# Patient Record
Sex: MALE | Race: WHITE | Employment: UNEMPLOYED | URBAN - METROPOLITAN AREA
[De-identification: names, ages, dates, MRNs, and addresses within clinical notes are randomized per-mention and may not be internally consistent; named-entity substitution may affect disease eponyms.]

---

## 2017-04-14 ENCOUNTER — HOSPITAL ENCOUNTER (INPATIENT)
Age: 27
LOS: 2 days | Discharge: HOME | DRG: 638 | End: 2017-04-16
Attending: FAMILY MEDICINE | Admitting: HOSPITALIST
Payer: SELF-PAY

## 2017-04-14 DIAGNOSIS — E86.0 DEHYDRATION: ICD-10-CM

## 2017-04-14 DIAGNOSIS — E10.10 TYPE 1 DIABETES MELLITUS WITH KETOACIDOSIS WITHOUT COMA (HCC): Primary | ICD-10-CM

## 2017-04-14 PROBLEM — R11.10 VOMITING: Status: ACTIVE | Noted: 2017-04-14

## 2017-04-14 LAB
ADMINISTERED INITIALS, ADMINIT: NORMAL
ALBUMIN SERPL BCP-MCNC: 3.9 G/DL (ref 3.4–5)
ALBUMIN SERPL BCP-MCNC: 4.9 G/DL (ref 3.4–5)
ALBUMIN/GLOB SERPL: 0.9 {RATIO} (ref 0.8–1.7)
ALBUMIN/GLOB SERPL: 1 {RATIO} (ref 0.8–1.7)
ALP SERPL-CCNC: 104 U/L (ref 45–117)
ALP SERPL-CCNC: 142 U/L (ref 45–117)
ALT SERPL-CCNC: 23 U/L (ref 16–61)
ALT SERPL-CCNC: 29 U/L (ref 16–61)
ANION GAP BLD CALC-SCNC: 12 MMOL/L (ref 3–18)
ANION GAP BLD CALC-SCNC: 20 MMOL/L (ref 3–18)
ANION GAP BLD CALC-SCNC: 30 MMOL/L (ref 10–20)
ANION GAP BLD CALC-SCNC: 30 MMOL/L (ref 3–18)
APPEARANCE UR: CLEAR
ARTERIAL PATENCY WRIST A: ABNORMAL
AST SERPL W P-5'-P-CCNC: 13 U/L (ref 15–37)
AST SERPL W P-5'-P-CCNC: 9 U/L (ref 15–37)
BACTERIA URNS QL MICRO: ABNORMAL /HPF
BASE DEFICIT BLDV-SCNC: 22 MMOL/L
BASOPHILS # BLD AUTO: 0 K/UL (ref 0–0.06)
BASOPHILS # BLD: 0 % (ref 0–2)
BDY SITE: ABNORMAL
BILIRUB SERPL-MCNC: 0.7 MG/DL (ref 0.2–1)
BILIRUB SERPL-MCNC: 0.7 MG/DL (ref 0.2–1)
BILIRUB UR QL: NEGATIVE
BODY TEMPERATURE: 97
BUN BLD-MCNC: 15 MG/DL (ref 7–18)
BUN SERPL-MCNC: 10 MG/DL (ref 7–18)
BUN SERPL-MCNC: 10 MG/DL (ref 7–18)
BUN SERPL-MCNC: 13 MG/DL (ref 7–18)
BUN/CREAT SERPL: 8 (ref 12–20)
CA-I BLD-MCNC: 1.3 MMOL/L (ref 1.12–1.32)
CALCIUM SERPL-MCNC: 8.5 MG/DL (ref 8.5–10.1)
CALCIUM SERPL-MCNC: 8.6 MG/DL (ref 8.5–10.1)
CALCIUM SERPL-MCNC: 9.5 MG/DL (ref 8.5–10.1)
CHLORIDE BLD-SCNC: 104 MMOL/L (ref 100–108)
CHLORIDE SERPL-SCNC: 105 MMOL/L (ref 100–108)
CHLORIDE SERPL-SCNC: 106 MMOL/L (ref 100–108)
CHLORIDE SERPL-SCNC: 94 MMOL/L (ref 100–108)
CK MB CFR SERPL CALC: 5.2 % (ref 0–4)
CK MB SERPL-MCNC: 4.8 NG/ML (ref 5–25)
CK SERPL-CCNC: 93 U/L (ref 39–308)
CO2 BLD-SCNC: 9 MMOL/L (ref 19–24)
CO2 SERPL-SCNC: 11 MMOL/L (ref 21–32)
CO2 SERPL-SCNC: 15 MMOL/L (ref 21–32)
CO2 SERPL-SCNC: 19 MMOL/L (ref 21–32)
COLOR UR: YELLOW
CREAT SERPL-MCNC: 1.28 MG/DL (ref 0.6–1.3)
CREAT SERPL-MCNC: 1.31 MG/DL (ref 0.6–1.3)
CREAT SERPL-MCNC: 1.66 MG/DL (ref 0.6–1.3)
CREAT UR-MCNC: 1.1 MG/DL (ref 0.6–1.3)
D50 ADMINISTERED, D50ADM: 0 ML
D50 ORDER, D50ORD: 0 ML
DIFFERENTIAL METHOD BLD: ABNORMAL
EOSINOPHIL # BLD: 0.1 K/UL (ref 0–0.4)
EOSINOPHIL NFR BLD: 0 % (ref 0–5)
EPITH CASTS URNS QL MICRO: 0 /LPF (ref 0–5)
ERYTHROCYTE [DISTWIDTH] IN BLOOD BY AUTOMATED COUNT: 12.5 % (ref 11.6–14.5)
EST. AVERAGE GLUCOSE BLD GHB EST-MCNC: 220 MG/DL
GAS FLOW.O2 O2 DELIVERY SYS: ABNORMAL L/MIN
GLOBULIN SER CALC-MCNC: 4.1 G/DL (ref 2–4)
GLOBULIN SER CALC-MCNC: 5.2 G/DL (ref 2–4)
GLUCOSE BLD STRIP.AUTO-MCNC: 111 MG/DL (ref 70–110)
GLUCOSE BLD STRIP.AUTO-MCNC: 160 MG/DL (ref 70–110)
GLUCOSE BLD STRIP.AUTO-MCNC: 165 MG/DL (ref 70–110)
GLUCOSE BLD STRIP.AUTO-MCNC: 175 MG/DL (ref 70–110)
GLUCOSE BLD STRIP.AUTO-MCNC: 176 MG/DL (ref 70–110)
GLUCOSE BLD STRIP.AUTO-MCNC: 184 MG/DL (ref 70–110)
GLUCOSE BLD STRIP.AUTO-MCNC: 211 MG/DL (ref 70–110)
GLUCOSE BLD STRIP.AUTO-MCNC: 234 MG/DL (ref 70–110)
GLUCOSE BLD STRIP.AUTO-MCNC: 240 MG/DL (ref 70–110)
GLUCOSE BLD STRIP.AUTO-MCNC: 270 MG/DL (ref 70–110)
GLUCOSE BLD STRIP.AUTO-MCNC: 287 MG/DL (ref 70–110)
GLUCOSE BLD STRIP.AUTO-MCNC: 304 MG/DL (ref 70–110)
GLUCOSE BLD STRIP.AUTO-MCNC: 399 MG/DL (ref 70–110)
GLUCOSE BLD STRIP.AUTO-MCNC: 418 MG/DL (ref 74–106)
GLUCOSE BLD STRIP.AUTO-MCNC: 88 MG/DL (ref 70–110)
GLUCOSE BLD STRIP.AUTO-MCNC: 99 MG/DL (ref 70–110)
GLUCOSE SERPL-MCNC: 193 MG/DL (ref 74–99)
GLUCOSE SERPL-MCNC: 281 MG/DL (ref 74–99)
GLUCOSE SERPL-MCNC: 433 MG/DL (ref 74–99)
GLUCOSE UR STRIP.AUTO-MCNC: >1000 MG/DL
GLUCOSE, GLC: 111 MG/DL
GLUCOSE, GLC: 160 MG/DL
GLUCOSE, GLC: 165 MG/DL
GLUCOSE, GLC: 175 MG/DL
GLUCOSE, GLC: 176 MG/DL
GLUCOSE, GLC: 184 MG/DL
GLUCOSE, GLC: 211 MG/DL
GLUCOSE, GLC: 234 MG/DL
GLUCOSE, GLC: 240 MG/DL
GLUCOSE, GLC: 270 MG/DL
GLUCOSE, GLC: 287 MG/DL
GLUCOSE, GLC: 304 MG/DL
GLUCOSE, GLC: 88 MG/DL
GLUCOSE, GLC: 99 MG/DL
HBA1C MFR BLD: 9.3 % (ref 4.5–5.6)
HCO3 BLDV-SCNC: 7.3 MMOL/L (ref 23–28)
HCT VFR BLD AUTO: 53.1 % (ref 36–48)
HCT VFR BLD CALC: 56 % (ref 36–49)
HGB BLD-MCNC: 18.6 G/DL (ref 13–16)
HGB BLD-MCNC: 19 G/DL (ref 12–16)
HGB UR QL STRIP: ABNORMAL
HIGH TARGET, HITG: 180 MG/DL
INSULIN ADMINSTERED, INSADM: 1.3 UNITS/HOUR
INSULIN ADMINSTERED, INSADM: 1.8 UNITS/HOUR
INSULIN ADMINSTERED, INSADM: 11.4 UNITS/HOUR
INSULIN ADMINSTERED, INSADM: 12.2 UNITS/HOUR
INSULIN ADMINSTERED, INSADM: 14.6 UNITS/HOUR
INSULIN ADMINSTERED, INSADM: 16.8 UNITS/HOUR
INSULIN ADMINSTERED, INSADM: 2.2 UNITS/HOUR
INSULIN ADMINSTERED, INSADM: 3.6 UNITS/HOUR
INSULIN ADMINSTERED, INSADM: 3.7 UNITS/HOUR
INSULIN ADMINSTERED, INSADM: 4.2 UNITS/HOUR
INSULIN ADMINSTERED, INSADM: 4.6 UNITS/HOUR
INSULIN ADMINSTERED, INSADM: 6 UNITS/HOUR
INSULIN ADMINSTERED, INSADM: 7 UNITS/HOUR
INSULIN ADMINSTERED, INSADM: 8.1 UNITS/HOUR
INSULIN ORDER, INSORD: 1.3 UNITS/HOUR
INSULIN ORDER, INSORD: 1.8 UNITS/HOUR
INSULIN ORDER, INSORD: 11.4 UNITS/HOUR
INSULIN ORDER, INSORD: 12.2 UNITS/HOUR
INSULIN ORDER, INSORD: 14.6 UNITS/HOUR
INSULIN ORDER, INSORD: 16.8 UNITS/HOUR
INSULIN ORDER, INSORD: 2.2 UNITS/HOUR
INSULIN ORDER, INSORD: 3.6 UNITS/HOUR
INSULIN ORDER, INSORD: 3.7 UNITS/HOUR
INSULIN ORDER, INSORD: 4.2 UNITS/HOUR
INSULIN ORDER, INSORD: 4.6 UNITS/HOUR
INSULIN ORDER, INSORD: 6 UNITS/HOUR
INSULIN ORDER, INSORD: 7 UNITS/HOUR
INSULIN ORDER, INSORD: 8.1 UNITS/HOUR
KETONES UR QL STRIP.AUTO: >160 MG/DL
LEUKOCYTE ESTERASE UR QL STRIP.AUTO: NEGATIVE
LOW TARGET, LOT: 140 MG/DL
LYMPHOCYTES # BLD AUTO: 10 % (ref 21–52)
LYMPHOCYTES # BLD: 1.8 K/UL (ref 0.9–3.6)
MCH RBC QN AUTO: 31 PG (ref 24–34)
MCHC RBC AUTO-ENTMCNC: 35 G/DL (ref 31–37)
MCV RBC AUTO: 88.5 FL (ref 74–97)
MINUTES UNTIL NEXT BG, NBG: 60 MIN
MONOCYTES # BLD: 0.7 K/UL (ref 0.05–1.2)
MONOCYTES NFR BLD AUTO: 4 % (ref 3–10)
MULTIPLIER, MUL: 0.02
MULTIPLIER, MUL: 0.03
MULTIPLIER, MUL: 0.04
MULTIPLIER, MUL: 0.05
MULTIPLIER, MUL: 0.06
MULTIPLIER, MUL: 0.06
MULTIPLIER, MUL: 0.07
MULTIPLIER, MUL: 0.08
NEUTS SEG # BLD: 16 K/UL (ref 1.8–8)
NEUTS SEG NFR BLD AUTO: 86 % (ref 40–73)
NITRITE UR QL STRIP.AUTO: NEGATIVE
ORDER INITIALS, ORDINIT: NORMAL
PCO2 BLDV: 25.8 MMHG (ref 41–51)
PH BLDV: 7.06 [PH] (ref 7.32–7.42)
PH UR STRIP: 5 [PH] (ref 5–8)
PLATELET # BLD AUTO: 317 K/UL (ref 135–420)
PMV BLD AUTO: 10.5 FL (ref 9.2–11.8)
PO2 BLDV: 28 MMHG (ref 25–40)
POTASSIUM BLD-SCNC: 4.9 MMOL/L (ref 3.5–5.5)
POTASSIUM SERPL-SCNC: 3.6 MMOL/L (ref 3.5–5.5)
POTASSIUM SERPL-SCNC: 4.3 MMOL/L (ref 3.5–5.5)
POTASSIUM SERPL-SCNC: 4.9 MMOL/L (ref 3.5–5.5)
PROT SERPL-MCNC: 10.1 G/DL (ref 6.4–8.2)
PROT SERPL-MCNC: 8 G/DL (ref 6.4–8.2)
PROT UR STRIP-MCNC: 100 MG/DL
RBC # BLD AUTO: 6 M/UL (ref 4.7–5.5)
RBC #/AREA URNS HPF: ABNORMAL /HPF (ref 0–5)
SAO2 % BLDV: 35 % (ref 65–88)
SERVICE CMNT-IMP: ABNORMAL
SODIUM BLD-SCNC: 137 MMOL/L (ref 136–145)
SODIUM SERPL-SCNC: 135 MMOL/L (ref 136–145)
SODIUM SERPL-SCNC: 137 MMOL/L (ref 136–145)
SODIUM SERPL-SCNC: 140 MMOL/L (ref 136–145)
SP GR UR REFRACTOMETRY: 1.02 (ref 1–1.03)
SPECIMEN TYPE: ABNORMAL
TROPONIN I SERPL-MCNC: <0.02 NG/ML (ref 0–0.06)
UROBILINOGEN UR QL STRIP.AUTO: 0.2 EU/DL (ref 0.2–1)
WBC # BLD AUTO: 18.6 K/UL (ref 4.6–13.2)
WBC URNS QL MICRO: ABNORMAL /HPF (ref 0–5)

## 2017-04-14 PROCEDURE — 99285 EMERGENCY DEPT VISIT HI MDM: CPT

## 2017-04-14 PROCEDURE — 82962 GLUCOSE BLOOD TEST: CPT

## 2017-04-14 PROCEDURE — 82550 ASSAY OF CK (CPK): CPT | Performed by: FAMILY MEDICINE

## 2017-04-14 PROCEDURE — 80053 COMPREHEN METABOLIC PANEL: CPT | Performed by: HOSPITALIST

## 2017-04-14 PROCEDURE — 36415 COLL VENOUS BLD VENIPUNCTURE: CPT | Performed by: HOSPITALIST

## 2017-04-14 PROCEDURE — 74011636637 HC RX REV CODE- 636/637: Performed by: FAMILY MEDICINE

## 2017-04-14 PROCEDURE — 65610000006 HC RM INTENSIVE CARE

## 2017-04-14 PROCEDURE — 81001 URINALYSIS AUTO W/SCOPE: CPT | Performed by: FAMILY MEDICINE

## 2017-04-14 PROCEDURE — 80048 BASIC METABOLIC PNL TOTAL CA: CPT | Performed by: HOSPITALIST

## 2017-04-14 PROCEDURE — 80047 BASIC METABLC PNL IONIZED CA: CPT

## 2017-04-14 PROCEDURE — 96375 TX/PRO/DX INJ NEW DRUG ADDON: CPT

## 2017-04-14 PROCEDURE — 74011250636 HC RX REV CODE- 250/636: Performed by: FAMILY MEDICINE

## 2017-04-14 PROCEDURE — 85025 COMPLETE CBC W/AUTO DIFF WBC: CPT | Performed by: FAMILY MEDICINE

## 2017-04-14 PROCEDURE — 82803 BLOOD GASES ANY COMBINATION: CPT

## 2017-04-14 PROCEDURE — 82010 KETONE BODYS QUAN: CPT | Performed by: FAMILY MEDICINE

## 2017-04-14 PROCEDURE — 74011000258 HC RX REV CODE- 258: Performed by: HOSPITALIST

## 2017-04-14 PROCEDURE — 96365 THER/PROPH/DIAG IV INF INIT: CPT

## 2017-04-14 PROCEDURE — 74011000258 HC RX REV CODE- 258: Performed by: FAMILY MEDICINE

## 2017-04-14 PROCEDURE — 80053 COMPREHEN METABOLIC PANEL: CPT | Performed by: FAMILY MEDICINE

## 2017-04-14 PROCEDURE — 83036 HEMOGLOBIN GLYCOSYLATED A1C: CPT | Performed by: HOSPITALIST

## 2017-04-14 PROCEDURE — 96361 HYDRATE IV INFUSION ADD-ON: CPT

## 2017-04-14 RX ORDER — SODIUM CHLORIDE 9 MG/ML
150 INJECTION, SOLUTION INTRAVENOUS CONTINUOUS
Status: DISCONTINUED | OUTPATIENT
Start: 2017-04-14 | End: 2017-04-14

## 2017-04-14 RX ORDER — MAGNESIUM SULFATE 100 %
4 CRYSTALS MISCELLANEOUS AS NEEDED
Status: DISCONTINUED | OUTPATIENT
Start: 2017-04-14 | End: 2017-04-16 | Stop reason: HOSPADM

## 2017-04-14 RX ORDER — MORPHINE SULFATE 2 MG/ML
2 INJECTION, SOLUTION INTRAMUSCULAR; INTRAVENOUS ONCE
Status: COMPLETED | OUTPATIENT
Start: 2017-04-14 | End: 2017-04-14

## 2017-04-14 RX ORDER — DEXTROSE MONOHYDRATE AND SODIUM CHLORIDE 5; .45 G/100ML; G/100ML
150 INJECTION, SOLUTION INTRAVENOUS CONTINUOUS
Status: DISCONTINUED | OUTPATIENT
Start: 2017-04-14 | End: 2017-04-15

## 2017-04-14 RX ORDER — DEXTROSE 50 % IN WATER (D50W) INTRAVENOUS SYRINGE
25-50 AS NEEDED
Status: DISCONTINUED | OUTPATIENT
Start: 2017-04-14 | End: 2017-04-16 | Stop reason: HOSPADM

## 2017-04-14 RX ORDER — SODIUM CHLORIDE 9 MG/ML
150 INJECTION, SOLUTION INTRAVENOUS CONTINUOUS
Status: DISCONTINUED | OUTPATIENT
Start: 2017-04-14 | End: 2017-04-15

## 2017-04-14 RX ORDER — ONDANSETRON 2 MG/ML
4 INJECTION INTRAMUSCULAR; INTRAVENOUS ONCE
Status: COMPLETED | OUTPATIENT
Start: 2017-04-14 | End: 2017-04-14

## 2017-04-14 RX ADMIN — Medication 2 MG: at 08:39

## 2017-04-14 RX ADMIN — HUMAN INSULIN 10 UNITS: 100 INJECTION, SOLUTION SUBCUTANEOUS at 08:37

## 2017-04-14 RX ADMIN — SODIUM CHLORIDE 2000 ML: 900 INJECTION, SOLUTION INTRAVENOUS at 08:28

## 2017-04-14 RX ADMIN — SODIUM CHLORIDE 3.6 UNITS/HR: 900 INJECTION, SOLUTION INTRAVENOUS at 10:00

## 2017-04-14 RX ADMIN — SODIUM CHLORIDE 150 ML/HR: 900 INJECTION, SOLUTION INTRAVENOUS at 09:23

## 2017-04-14 RX ADMIN — DEXTROSE MONOHYDRATE AND SODIUM CHLORIDE 150 ML/HR: 5; .45 INJECTION, SOLUTION INTRAVENOUS at 18:41

## 2017-04-14 RX ADMIN — ONDANSETRON 4 MG: 2 INJECTION INTRAMUSCULAR; INTRAVENOUS at 08:38

## 2017-04-14 RX ADMIN — DEXTROSE MONOHYDRATE AND SODIUM CHLORIDE 150 ML/HR: 5; .45 INJECTION, SOLUTION INTRAVENOUS at 11:03

## 2017-04-14 NOTE — ED NOTES
Call made to ICU to give report. Unable to give report at this time. Receiving RN to call this RN back.

## 2017-04-14 NOTE — IP AVS SNAPSHOT
Summary of Care Report The Summary of Care report has been created to help improve care coordination. Users with access to [employer] or [address] (Web-based application) may access additional patient information including the Discharge Summary. If you are not currently a [address] user and need more information, please call the number listed below in the [place] [address] section and ask to be connected with Medical Records. Facility Information Name Address Phone [address] [place] [phone] Patient Information Patient Name Sex  Claudia Teresa ([id number]) Male 1990 Discharge Information Admitting Provider Service Area Unit Olya Messer MD / [phone] 8 [address] Surg/Onco / [phone] Discharge Provider Discharge Date/Time Discharge Disposition Destination (none) 2017 (Pending) AHR (none) Patient Language Language ENGLISH [13] Hospital Problems as of 2017  Reviewed: 4/15/2014  1:38 AM by Teresa Frey MD  
  
  
  
 Class Noted - Resolved Last Modified POA Active Problems * (Principal)DKA, type 1 ([address]  2013 - Present 2017 by Olya Messer MD Unknown Entered by Micah Robison Dehydration  2013 - Present 2017 by Olya Messer MD Yes Entered by Micah Robison Hyponatremia  2013 - Present 2017 by Olya Messer MD Yes Entered by Micah Robison Overview Signed 2013  3:50 PM by Micah Robison  
   pseudohyponatremia Abdominal pain  2013 - Present 2017 by Olya Messer MD Yes Entered by Micah Robison Nausea  2013 - Present 2017 by Olya Messer MD Yes Entered by Micah Robison [facility]acute renal failure) [facility]  1/13/2014 - Present 4/14/2017 by Melisa Hart MD Yes Entered by Melisa Hart MD  
  Vomiting  4/14/2017 - Present 4/14/2017 by Melisa Hart MD Unknown Entered by Melisa Hart MD  
  
Non-Hospital Problems as of 4/16/2017  Reviewed: 4/15/2014  1:38 AM by Terry Nuñez MD  
 None You are allergic to the following Allergen Reactions Pcn (Penicillins) Unknown (comments) Family allergy Current Discharge Medication List  
  
CONTINUE these medications which have CHANGED Dose & Instructions Dispensing Information Comments  
 insulin glargine 100 unit/mL injection Commonly known as:  LANTUS What changed:  how much to take Dose:  50 Units 50 Units by SubCUTAneous route nightly. Quantity:  1 Vial  
Refills:  5  
   
 insulin lispro 100 unit/mL injection Commonly known as:  HUMALOG What changed:  how much to take Dose:  10 Units 10 Units by SubCUTAneous route Before breakfast, lunch, and dinner. Quantity:  1 Vial  
Refills:  5 Follow-up Information Follow up With Details Comments Contact Info Murray Schmidt MD   Patient can only remember the practice name and not the physician Discharge Instructions Lab Results Component Value Date/Time Hemoglobin A1c 9.3 04/14/2017 08:22 AM  
 
This lab test reflects that your blood sugar averaged 220 mg/dl  over the past 3 months. It is important to follow up with your provider on a routine basis to continue to evaluate your blood sugar and discuss any necessary changes in treatment. Patient armband removed and shredded DISCHARGE SUMMARY from Nurse The following personal items are in your possession at time of discharge: 
 
  
Visual Aid: None Clothing: Shirt, Pants, Undergarments, Slippers Other Valuables: Cell Phone, Other (comment) (Cell phone ) PATIENT INSTRUCTIONS: 
 
 
F-face looks uneven A-arms unable to move or move unevenly S-speech slurred or non-existent T-time-call 911 as soon as signs and symptoms begin-DO NOT go Back to bed or wait to see if you get better-TIME IS BRAIN. Warning Signs of HEART ATTACK Call 911 if you have these symptoms: 
? Chest discomfort. Most heart attacks involve discomfort in the center of the chest that lasts more than a few minutes, or that goes away and comes back. It can feel like uncomfortable pressure, squeezing, fullness, or pain. ? Discomfort in other areas of the upper body. Symptoms can include pain or discomfort in one or both arms, the back, neck, jaw, or stomach. ? Shortness of breath with or without chest discomfort. ? Other signs may include breaking out in a cold sweat, nausea, or lightheadedness. Don't wait more than five minutes to call [address]! Fast action can save your life. Calling 911 is almost always the fastest way to get lifesaving treatment. Emergency Medical Services staff can begin treatment when they arrive  up to an hour sooner than if someone gets to the hospital by car. The discharge information has been reviewed with the patient. The patient verbalized understanding. Discharge medications reviewed with the patient and appropriate educational materials and side effects teaching were provided. Chart Review Routing History Recipient Method Report Sent By Jossie Domingo MD  
Phone: [phone] In Basket IP Auto Routed Lauren Thomas MD [[id number]] 8/23/2014 10:46 PM 08/23/2014 Kimberli Garcia MD  
Fax: [phone] Phone: [phone] Fax IP Auto Routed Travis Millard MD [[id number]] 8/23/2014 10:46 PM 08/23/2014

## 2017-04-14 NOTE — ED NOTES
Pt reports to this RN, \"I got startled by my pump when it started beeping and I accidentally kicked the urinal over. \" Approx 400 mL of urine in urinal and urine noted to be all over floor. Site cleaned and housekeeping called. Pt resting in stretcher in NAD. Pt remains on cardiac monitor and IV fluids. Family at bedside. Pt updated on plan of care regarding insulin drip.

## 2017-04-14 NOTE — DIABETES MGMT
Diabetes Patient/Family Education Record    Factors That  May Influence Patients Ability  to Learn or  Comply With  Recommendations:    []   Language barrier    []   Cultural needs   [x]   Motivation    []   Cognitive limitation    []   Physical   []   Education    []   Physiological factors   []   Hearing/vision/speaking impairment   []   Voodoo beliefs    []   Financial factors   []  Other:   []  No factors identified at this time.      Person Instructed:   [x]   Patient   []   Family   []  Other     Preference for Learning:   [x]   Verbal   [x]   Written   []  Demonstration     Level of Comprehension & Competence:    []  Good                                      [x] Fair                                     []  Poor                             [x]  Needs Reinforcement   []  Teachback completed    Education Component:   [x]  Medication management, confirmation of home regimen   [x]  Nutritional management including the role of carbohydrate intake, CHO Counting   []  Exercise   [x]  Signs, symptoms, and treatment of hyperglycemia and hypoglycemia   [x] Treatment of hyperglycemia and hypoglycemia   [x]  Importance of blood glucose monitoring and how to obtain a blood glucose meter    []  Instruction on use of blood glucose meter   [x]  Discuss the importance of HbA1C monitoring and provide patient with  results   []  Sick day guidelines   []  Proper use and disposal of lancets, needles, syringes or insulin pens (if appropriate)   []  Potential long-term complications (retinopathy, kidney disease, neuropathy, heart disease, stroke, vascular disease, foot care)   [] Provide emergency contact number and contact number for more information    [x]  Goal:  Patient/family will demonstrate understanding of Diabetes Self Management Skills by: (date) __5/1/17___  Plan for post-discharge education or self-management support:    [x] Outpatient class schedule provided            [] Patient Declined    [] Scheduled for outpatient classes (date) _______         M.  Callie Bah, MPH, RD

## 2017-04-14 NOTE — ED NOTES
Karine Bishop MD to bedside to speak with and assess pt. Pt resting in stretcher in NAD, VSS at this time. Pt noted to be texting. Pt calm and cooperative. MD to place orders for D5% 0.45% NS due to FSBS of 184 mg/dL per protocol.

## 2017-04-14 NOTE — ED NOTES
Call made to lab regarding metabolic panel results. Critical value received. Laboratory states results to be posted at this time.

## 2017-04-14 NOTE — ROUTINE PROCESS
TRANSFER - OUT REPORT:    Verbal report given to Sadie Washington RN (name) on Huseyin Alba II  being transferred to ICU (unit) for routine progression of care       Report consisted of patients Situation, Background, Assessment and   Recommendations(SBAR). Information from the following report(s) SBAR, ED Summary, MAR, Recent Results and Cardiac Rhythm Sinus Tachycardia was reviewed with the receiving nurse. Lines:   Peripheral IV 04/14/17 Left Antecubital (Active)   Site Assessment Clean, dry, & intact 4/14/2017  8:27 AM   Phlebitis Assessment 0 4/14/2017  8:27 AM   Infiltration Assessment 0 4/14/2017  8:27 AM   Dressing Status Clean, dry, & intact 4/14/2017  8:27 AM   Dressing Type Transparent 4/14/2017  8:27 AM   Hub Color/Line Status Patent; Flushed 4/14/2017  8:27 AM   Action Taken Blood drawn 4/14/2017  8:27 AM       Peripheral IV 04/14/17 Right Antecubital (Active)   Site Assessment Clean, dry, & intact 4/14/2017  8:27 AM   Phlebitis Assessment 0 4/14/2017  8:27 AM   Infiltration Assessment 0 4/14/2017  8:27 AM   Dressing Status Clean, dry, & intact 4/14/2017  8:27 AM   Dressing Type Transparent 4/14/2017  8:27 AM   Hub Color/Line Status Patent; Flushed 4/14/2017  8:27 AM        Opportunity for questions and clarification was provided.       Patient transported with:   Monitor  Registered Nurse  Tech

## 2017-04-14 NOTE — ED NOTES
Pt reports taking 20 units of Humalog at home at approx 0700 this am. Pt states, \"I wanted to see if it would make me feel better. \" MD aware. Pt given urinal and pt verbalizes understanding that urine specimen is needed. Warm blankets provided. Pt calm and cooperative at this time. Family to bedside.

## 2017-04-14 NOTE — PROGRESS NOTES
conducted an initial consultation and Spiritual Assessment for Magui Gamino, who is a 33 y.o.,male. Patients Primary Language is: [place]. According to the patients EMR Voodoo Affiliation is: No Jainism. The reason the Patient came to the hospital is:   Patient Active Problem List    Diagnosis Date Noted    Vomiting 04/14/2017    ARF (acute renal failure) ([address] 01/13/2014    DKA, type 1 ([address] 02/05/2013    Dehydration 02/05/2013    Hyponatremia 02/05/2013    Abdominal pain 02/05/2013    Nausea 02/05/2013        The  provided the following Interventions:  Initiated a relationship of care and support. Explored issues of bushra, belief, spirituality and Lutheran/ritual needs while hospitalized. Listened empathically. Provided chaplaincy education. Provided information about Spiritual Care Services. Offered assurance of prayer on patient's behalf. Chart reviewed. The following outcomes where achieved:   confirmed Patient's Voodoo Affiliation. Patient processed feeling about current hospitalization. Patient expressed gratitude for 's visit. Assessment:  Patient does not have any Lutheran/cultural needs that will affect patients preferences in health care. Plan:  Chaplains will continue to follow and will provide pastoral care on an as needed/requested basis.  recommends bedside caregivers page  on duty if patient shows signs of acute spiritual or emotional distress. Rev.  Hima Song  [address] [facility]  [phone]

## 2017-04-14 NOTE — IP AVS SNAPSHOT
[address] [place] [address] 
[phone] Patient: Rachel Myrick 
MRN: [id number] OOQ:[id number] You are allergic to the following Allergen Reactions Pcn (Penicillins) Unknown (comments) Family allergy Recent Documentation Height Weight BMI Smoking Status 1.803 m 79.7 kg 24.52 kg/m2 Former Smoker Unresulted Labs Order Current Status BETA-HYDROXYBUTYRATE In process Emergency Contacts Name Discharge Info Relation Home Work Mobile Juana Roblero  Parent [1] [phone] About your hospitalization You were admitted on:  April 14, 2017 You last received care in the:  [address] [place] [address] You were discharged on:  April 16, 2017 Unit phone number:  [phone] Why you were hospitalized Your primary diagnosis was:  Dka, Type 1 (Hcc) Your diagnoses also included:  Nausea, Arf (Acute Renal Failure) (Hcc), Dehydration, Hyponatremia, Abdominal Pain, Vomiting Providers Seen During Your Hospitalizations Provider Role Specialty Primary office phone Jerardo Longo MD Attending Provider Emergency Medicine [phone] Eliseo Toure MD Attending Provider [facility] [phone] Your Primary Care Physician (PCP) Primary Care Physician Office Phone Office Fax OTHER, PHYS ** None ** ** None ** Follow-up Information Follow up With Details Comments Contact Info Murray Schmidt MD   Patient can only remember the practice name and not the physician Current Discharge Medication List  
  
CONTINUE these medications which have CHANGED Dose & Instructions Dispensing Information Comments Morning Noon Evening Bedtime  
 insulin glargine 100 unit/mL injection Commonly known as:  LANTUS What changed:  how much to take Your last dose was: Your next dose is: Dose:  50 Units 50 Units by SubCUTAneous route nightly. Quantity:  1 Vial  
Refills:  5  
     
   
   
   
  
 insulin lispro 100 unit/mL injection Commonly known as:  HUMALOG What changed:  how much to take Your last dose was: Your next dose is:    
   
   
 Dose:  10 Units 10 Units by SubCUTAneous route Before breakfast, lunch, and dinner. Quantity:  1 Vial  
Refills:  5 Where to Get Your Medications Information on where to get these meds will be given to you by the nurse or doctor. ! Ask your nurse or doctor about these medications  
  insulin glargine 100 unit/mL injection  
 insulin lispro 100 unit/mL injection Discharge Instructions Lab Results Component Value Date/Time Hemoglobin A1c 9.3 04/14/2017 08:22 AM  
 
This lab test reflects that your blood sugar averaged 220 mg/dl  over the past 3 months. It is important to follow up with your provider on a routine basis to continue to evaluate your blood sugar and discuss any necessary changes in treatment. Patient armband removed and shredded DISCHARGE SUMMARY from Nurse The following personal items are in your possession at time of discharge: 
 
  
Visual Aid: None Clothing: Shirt, Pants, Undergarments, Slippers Other Valuables: Cell Phone, Other (comment) (Cell phone ) PATIENT INSTRUCTIONS: 
 
 
F-face looks uneven A-arms unable to move or move unevenly S-speech slurred or non-existent T-time-call 911 as soon as signs and symptoms begin-DO NOT go Back to bed or wait to see if you get better-TIME IS BRAIN. Warning Signs of HEART ATTACK Call 911 if you have these symptoms: 
? Chest discomfort. Most heart attacks involve discomfort in the center of the chest that lasts more than a few minutes, or that goes away and comes back. It can feel like uncomfortable pressure, squeezing, fullness, or pain. ? Discomfort in other areas of the upper body. Symptoms can include pain or discomfort in one or both arms, the back, neck, jaw, or stomach. ? Shortness of breath with or without chest discomfort. ? Other signs may include breaking out in a cold sweat, nausea, or lightheadedness. Don't wait more than five minutes to call [address]! Fast action can save your life. Calling 911 is almost always the fastest way to get lifesaving treatment. Emergency Medical Services staff can begin treatment when they arrive  up to an hour sooner than if someone gets to the hospital by car. The discharge information has been reviewed with the patient. The patient verbalized understanding. Discharge medications reviewed with the patient and appropriate educational materials and side effects teaching were provided. Discharge Orders None Introducing [facility]! [employer] introduces [employer] patient portal. Now you can access parts of your medical record, email your doctor's office, and request medication refills online. 1. In your internet browser, go to [email or web address] 2. Click on the First Time User? Click Here link in the Sign In box. You will see the New Member Sign Up page. 3. Enter your Elevate Access Code exactly as it appears below. You will not need to use this code after youve completed the sign-up process. If you do not sign up before the expiration date, you must request a new code. · Ele[employer] Access Code: [email or web address] Expires: 7/13/2017  7:37 AM 
 
4.  Enter the last four digits of your Social Security Number (xxxx) and Date of Birth (mm/dd/yyyy) as indicated and click Submit. You will be taken to the next sign-up page. 5. Create a [employer] ID. This will be your [employer] login ID and cannot be changed, so think of one that is secure and easy to remember. 6. Create a [employer] password. You can change your password at any time. 7. Enter your Password Reset Question and Answer. This can be used at a later time if you forget your password. 8. Enter your e-mail address. You will receive e-mail notification when new information is available in [address] 9. Click Sign Up. You can now view and download portions of your medical record. 10. Click the Download Summary menu link to download a portable copy of your medical information. If you have questions, please visit the Frequently Asked Questions section of the [employer] website. Remember, [employer] is NOT to be used for urgent needs. For medical emergencies, dial 911. Now available from your iPhone and Android! General Information Please provide this summary of care documentation to your next provider. Patient Signature:  ____________________________________________________________ Date:  ____________________________________________________________  
  
Marcelino Berry Provider Signature:  ____________________________________________________________ Date:  ____________________________________________________________

## 2017-04-14 NOTE — ED PROVIDER NOTES
[address]  EMERGENCY DEPARTMENT HISTORY AND PHYSICAL EXAM       Date: 4/14/2017   Patient Name: Theo Kapoor II   YOB: 1990  Medical Record Number: [id number]    History of Presenting Illness     Chief Complaint   Patient presents with    Nausea    Vomiting        History Provided By:  patient    Additional History:   Susanne Emmanuel is a 33 y.o. male with a hx of DM1 who presents with mother and sister to the emergency department for possible DKA. Associated sxs, which began approximately 5 hours ago, include nausea/vomiting x 4 and headache that he describes as feeling like an \"ice pick. \" He states current sxs feel similar to when he was last in DKA several years ago. He denies dysuria, rash, and any other sxs or complaints. Primary Care Provider: Murray Schmidt MD   Specialist:    Past History     Past Medical History:   Past Medical History:   Diagnosis Date    Depression     Diabetes ([facility]     type I diabetes    Type 1 diabetes mellitus ([facility]         Past Surgical History:   Past Surgical History:   Procedure Laterality Date    HX UROLOGICAL          Family History:   History reviewed. No pertinent family history. Social History:   Social History   Substance Use Topics    Smoking status: Former Smoker     Packs/day: 0.25    Smokeless tobacco: None    Alcohol use Yes      Comment: ocassionally        Allergies: Allergies   Allergen Reactions    Pcn [Penicillins] Unknown (comments)     Family allergy        Review of Systems   Review of Systems   Gastrointestinal: Positive for nausea and vomiting. Genitourinary: Negative for dysuria. Skin: Negative for rash. Neurological: Positive for headaches. All other systems reviewed and are negative.       Physical Exam  Vitals:    04/14/17 0900 04/14/17 0930 04/14/17 1000 04/14/17 1030   BP: 126/63 123/80 126/73 123/75   Pulse: (!) 106 (!) 106 100 (!) 106   Resp: 17 18 20 17   Temp:       SpO2: 100% 100% 100% 100%   Weight:       Height:           Physical Exam   Nursing note and vitals reviewed. Vital signs and nursing notes reviewed    CONSTITUTIONAL: Alert. Moderately ill appearing in moderate pain. HEAD:  Normocephalic, atraumatic  EYES: PERRL; EOM's intact. ENTM: Nose: no rhinorrhea; Throat: no erythema or exudate, mucous membranes dry. Neck:  No JVD, supple without lymphadenopathy  RESP: Chest clear, equal breath sounds. Tachypneic with Kussmaul breathing. CV: Tachycardic. S1 and S2 WNL; No murmurs, gallops or rubs. GI: Normal bowel sounds, abdomen soft and non-tender. No masses or organomegaly. : No costo-vertebral angle tenderness. BACK:  Non-tender  UPPER EXT:  Normal inspection  LOWER EXT: No edema, no calf tenderness. Distal pulses intact. NEURO: CN intact, reflexes 2/4 and sym, strength 5/5 and sym, sensation intact. SKIN: No rashes; Normal for age and stage. Tattoos on bilateral arms. PSYCH:  Alert and oriented, normal affect. Diagnostic Study Results     Labs -      Recent Results (from the past 12 hour(s))   GLUCOSE, POC    Collection Time: 04/14/17  8:11 AM   Result Value Ref Range    Glucose (POC) 399 (H) 70 - 110 mg/dL   CBC WITH AUTOMATED DIFF    Collection Time: 04/14/17  8:22 AM   Result Value Ref Range    WBC 18.6 (H) 4.6 - 13.2 K/uL    RBC 6.00 (H) 4.70 - 5.50 M/uL    HGB 18.6 (H) 13.0 - 16.0 g/dL    HCT 53.1 (H) 36.0 - 48.0 %    MCV 88.5 74.0 - 97.0 FL    MCH 31.0 24.0 - 34.0 PG    MCHC 35.0 31.0 - 37.0 g/dL    RDW 12.5 11.6 - 14.5 %    PLATELET 528 404 - 778 K/uL    MPV 10.5 9.2 - 11.8 FL    NEUTROPHILS 86 (H) 40 - 73 %    LYMPHOCYTES 10 (L) 21 - 52 %    MONOCYTES 4 3 - 10 %    EOSINOPHILS 0 0 - 5 %    BASOPHILS 0 0 - 2 %    ABS. NEUTROPHILS 16.0 (H) 1.8 - 8.0 K/UL    ABS. LYMPHOCYTES 1.8 0.9 - 3.6 K/UL    ABS. MONOCYTES 0.7 0.05 - 1.2 K/UL    ABS. EOSINOPHILS 0.1 0.0 - 0.4 K/UL    ABS.  BASOPHILS 0.0 0.0 - 0.06 K/UL    DF AUTOMATED     METABOLIC PANEL, COMPREHENSIVE    Collection Time: 04/14/17  8:22 AM   Result Value Ref Range    Sodium 135 (L) 136 - 145 mmol/L    Potassium 4.9 3.5 - 5.5 mmol/L    Chloride 94 (L) 100 - 108 mmol/L    CO2 11 (L) 21 - 32 mmol/L    Anion gap 30 (H) 3.0 - 18 mmol/L    Glucose 433 (HH) 74 - 99 mg/dL    BUN 13 7.0 - 18 MG/DL    Creatinine 1.66 (H) 0.6 - 1.3 MG/DL    BUN/Creatinine ratio 8 (L) 12 - 20      GFR est AA >60 >60 ml/min/1.73m2    GFR est non-AA 50 (L) >60 ml/min/1.73m2    Calcium 9.5 8.5 - 10.1 MG/DL    Bilirubin, total 0.7 0.2 - 1.0 MG/DL    ALT (SGPT) 29 16 - 61 U/L    AST (SGOT) 13 (L) 15 - 37 U/L    Alk.  phosphatase 142 (H) 45 - 117 U/L    Protein, total 10.1 (H) 6.4 - 8.2 g/dL    Albumin 4.9 3.4 - 5.0 g/dL    Globulin 5.2 (H) 2.0 - 4.0 g/dL    A-G Ratio 0.9 0.8 - 1.7     CARDIAC PANEL,(CK, CKMB & TROPONIN)    Collection Time: 04/14/17  8:22 AM   Result Value Ref Range    CK 93 39 - 308 U/L    CK - MB 4.8 (H) <3.6 ng/ml    CK-MB Index 5.2 (H) 0.0 - 4.0 %    Troponin-I, Qt. <0.02 0.00 - 0.06 NG/ML   POC CHEM8    Collection Time: 04/14/17  8:29 AM   Result Value Ref Range    CO2 (POC) 9 (LL) 19 - 24 MMOL/L    Glucose (POC) 418 (HH) 74 - 106 MG/DL    BUN (POC) 15 7 - 18 MG/DL    Creatinine (POC) 1.1 0.6 - 1.3 MG/DL    GFR-AA (POC) >60 >60 ml/min/1.73m2    GFR, non-AA (POC) >60 >60 ml/min/1.73m2    Sodium (POC) 137 136 - 145 MMOL/L    Potassium (POC) 4.9 3.5 - 5.5 MMOL/L    Calcium, ionized (POC) 1.30 1.12 - 1.32 MMOL/L    Chloride (POC) 104 100 - 108 MMOL/L    Anion gap (POC) 30 (H) 10 - 20      Hematocrit (POC) 56 (HH) 36 - 49 %    Hemoglobin (POC) 19.0 (H) 12 - 16 G/DL   POC VENOUS BLOOD GAS    Collection Time: 04/14/17  8:36 AM   Result Value Ref Range    Device: ROOM AIR      pH, venous (POC) 7.057 (LL) 7.32 - 7.42      pCO2, venous (POC) 25.8 (L) 41 - 51 MMHG    pO2, venous (POC) 28 25 - 40 mmHg    HCO3, venous (POC) 7.3 (L) 23.0 - 28.0 MMOL/L    sO2, venous (POC) 35 (L) 65 - 88 %    Base deficit, venous (POC) 22 mmol/L Allens test (POC) N/A      Site [facility]      Patient temp. 97.0      Specimen type (POC) VENOUS BLOOD      Performed by Leticia Alexander W/ RFLX MICROSCOPIC    Collection Time: 04/14/17  9:34 AM   Result Value Ref Range    Color YELLOW      Appearance CLEAR      Specific gravity 1.021 1.005 - 1.030      pH (UA) 5.0 5.0 - 8.0      Protein 100 (A) NEG mg/dL    Glucose >1000 (A) NEG mg/dL    Ketone >160 (A) NEG mg/dL    Bilirubin NEGATIVE  NEG      Blood MODERATE (A) NEG      Urobilinogen 0.2 0.2 - 1.0 EU/dL    Nitrites NEGATIVE  NEG      Leukocyte Esterase NEGATIVE  NEG     URINE MICROSCOPIC ONLY    Collection Time: 04/14/17  9:34 AM   Result Value Ref Range    WBC 0 to 3 0 - 5 /hpf    RBC 1 to 4 0 - 5 /hpf    Epithelial cells 0 0 - 5 /lpf    Bacteria FEW (A) NEG /hpf   GLUCOSE, POC    Collection Time: 04/14/17  9:57 AM   Result Value Ref Range    Glucose (POC) 240 (H) 70 - 110 mg/dL   GLUCOSTABILIZER    Collection Time: 04/14/17  9:58 AM   Result Value Ref Range    Glucose 240 mg/dL    Insulin order 3.6 units/hour    Insulin adminstered 3.6 units/hour    Multiplier 0.020     Low target 140 mg/dL    High target 180 mg/dL    D50 order 0.0 ml    D50 administered 0.00 ml    Minutes until next BG 60 min    Order initials RF     Administered initials RF    GLUCOSE, POC    Collection Time: 04/14/17 10:57 AM   Result Value Ref Range    Glucose (POC) 184 (H) 70 - 110 mg/dL   GLUCOSTABILIZER    Collection Time: 04/14/17 10:59 AM   Result Value Ref Range    Glucose 184 mg/dL    Insulin order 3.7 units/hour    Insulin adminstered 3.7 units/hour    Multiplier 0.030     Low target 140 mg/dL    High target 180 mg/dL    D50 order 0.0 ml    D50 administered 0.00 ml    Minutes until next BG 60 min    Order initials RF     Administered initials RF        Radiologic Studies -  The following have been ordered and reviewed:  No orders to display     Medical Decision Making   I am the first provider for this patient.      I reviewed the vital signs, available nursing notes, past medical history, past surgical history, family history and social history. Vital Signs-Reviewed the patient's vital signs. Patient Vitals for the past 12 hrs:   Temp Pulse Resp BP SpO2   04/14/17 1030 - (!) 106 17 123/75 100 %   04/14/17 1000 - 100 20 126/73 100 %   04/14/17 0930 - (!) 106 18 123/80 100 %   04/14/17 0900 - (!) 106 17 126/63 100 %   04/14/17 0832 - (!) 108 20 150/76 100 %   04/14/17 0746 97 °F (36.1 °C) (!) 120 20 - 100 %     Pulse-ox result reviewed by Ricky Esposito MD. Pulse ox is 100% on RA, normal, no intervention needed. CARDIAC MONITOR NOTE:  Cardiac Rhythm: Sinus tachycardia  Rate: 109 bpm  Intervention: IVFs   Written by Yoselin Rey ED Scribe, as dictated by Ricky Esposito MD.     Provider Notes:    INITIAL CLINICAL IMPRESSION and PLANS:  The patient presents with the primary complaint(s) of: possible DKA. The presentation, to include historical aspects and clinical findings are consistent with the DX of DKA. However, other possible DX's to consider as primary, associated with, or exacerbated by include:    Electrolyte abnormality, UTI    Considering the above, my initial management plan to evaluate and therapeutic interventions include the following and as noted in the orders:    1. Labs: CBC, CMP, Cardiac Panel, UA, Venous pH, Beta-hydroxybutyrate, Urine Microscope     Procedures:   Procedures    ED Course:  7:47 AM   Initial assessment performed. The patients presenting problems have been discussed, and they are in agreement with the care plan formulated and outlined with them. I have encouraged them to ask questions as they arise throughout their visit. 10:35 AM   Pt has been re-examined by Ricky Esposito MD. Pt is feeling better. No nausea. Decreased tachycardia and tachypnea. 10:51 AM    Ricky Esposito MD spoke via telephone consult with Cordell Obando MD (Internal Medicine).  He agrees to admit the pt to the ICU.     10:51 AM   Patient is being admitted to the hospital by Mihai Keene MD. The results of their tests and reasons for their admission have been discussed with them and/or available family. They convey agreement and understanding for the need to be admitted and for their admission diagnosis. CONDITIONS ON ADMISSION:  Urinary Tract Infection is not present at the time of admission. MRSA is not present at the time of admission. Wound infection is not present at the time of admission. Pressure Ulcer is not present at the time of admission. Medications Given in the ED:  Medications   insulin regular (NOVOLIN R, HUMULIN R) 100 Units in 0.9% sodium chloride 100 mL infusion (3.7 Units/hr IntraVENous Rate Change 4/14/17 1101)   0.9% sodium chloride infusion (150 mL/hr IntraVENous New Bag 4/14/17 0923)   sodium chloride 0.9 % bolus infusion 2,000 mL (0 mL IntraVENous IV Completed 4/14/17 0922)   ondansetron (ZOFRAN) injection 4 mg (4 mg IntraVENous Given 4/14/17 0838)   morphine injection 2 mg (2 mg IntraVENous Given 4/14/17 0839)   insulin regular (NOVOLIN R, HUMULIN R) injection 10 Units (10 Units IntraVENous Given 4/14/17 0837)       Critical Care Time: 30 minutes    Diagnosis   Clinical Impression:   1. Type 1 diabetes mellitus with ketoacidosis without coma (HCC)    2. Dehydration         Discussion:  Pt presented with vomiting, headache, and concern he was in DKA. Pt is a type 1 diabetic. He had acetones on his breath and Kussmaul breathing. He was dehydrated. Glucose was in the 400s. Venous pH was 7.0. Bicarb was low. He was started on IVFs and IV Insulin. He is symptomatically improved and will be admitted to the ICU.    ______________________________   Attestations: This note is prepared by Ravinder Goss, acting as a Scribe for Marisol Espino MD on 7:39 AM on 4/14/2017 .     Marisol Espino MD: The scribe's documentation has been prepared under my direction and personally reviewed by me in its entirety. _______________________________     10:53 AM  I have spent 30 minutes of critical care time involved in lab review, consultations with specialist, family decision-making, and documentation. During this entire length of time I was immediately available to the patient. Critical Care: The reason for providing this level of medical care for this critically ill patient was due a critical illness that impaired one or more vital organ systems such that there was a high probability of imminent or life threatening deterioration in the patients condition. This care involved high complexity decision making to assess, manipulate, and support vital system functions, to treat this degreee vital organ system failure and to prevent further life threatening deterioration of the patients condition.

## 2017-04-14 NOTE — ED NOTES
Pt resting in stretcher in [facility], VSS at this time. Side rails raised x 2 and call light within reach. Pt updated on plan of care regarding bed assignment.

## 2017-04-14 NOTE — H&P
History & Physical    Patient: Ronda Ness MRN: [id number]  CSN: [id number]    YOB: 1990  Age: 33 y.o. Sex: male      DOA: 4/14/2017  Primary Care Provider:  Murray Schmidt MD      Assessment/Plan     Patient Active Problem List   Diagnosis Code    DKA, type 1 ([facility] E10.10    Dehydration E86.0    Hyponatremia E87.1    Abdominal pain R10.9    Nausea R11.0    ARF (acute renal failure) ([facility] N17.9    Vomiting R11.10       Admit to ICU    Continue IV insulin and fluids   Monitor BMP every 8 hours   Monitor urine output with goal of at least 50 to 80cc per hr   Once off insulin drip then will transition to sq insulin based on needs   Check hemoglobin A1C   Will consult diabetic nurse educator. Will explain to the paitent about the impact of diabetes on kidney,heart,nervous system and eyes     YRIS - pre renal, follow renal function. Hyponatremia - pseudo, will improve after correction of glucose. Leukocytosis - reactive, follow wbc. DVT prophylaxis with heparin          CC: N/V       HPI:     Donte Gresham II is a 33 y.o. male who has past history of IDDM presents to ER with complains of N/V that started early this morning. Patient reports that he has been working for the last 6 days and he may have missed lantus one day. He reports he is compliant with his medications. Associated symptoms include abdominal pain and headache. He reports that symptoms are similar whenever he is in DKA. He denies any fever/chills, SOB, diarrhea. In ER he is noted to have glucose of 433, anion gap of 30, CO2 of 11, creatine of 1.66, he was started on glucose stabilizer and IVF. He is being admitted for further management. Past Medical History:   Diagnosis Date    Depression     Diabetes ([facility]     type I diabetes    Type 1 diabetes mellitus ([facility]        Past Surgical History:   Procedure Laterality Date    HX UROLOGICAL         History reviewed. No pertinent family history.     Social History     Social History    Marital status: SINGLE     Spouse name: N/A    Number of children: N/A    Years of education: N/A     Social History Main Topics    Smoking status: Former Smoker     Packs/day: 0.25    Smokeless tobacco: None    Alcohol use Yes      Comment: ocassionally    Drug use: No    Sexual activity: Not Asked     Other Topics Concern    None     Social History Narrative       Prior to Admission medications    Medication Sig Start Date End Date Taking? Authorizing Provider   insulin glargine (LANTUS) 100 unit/mL injection 40 Units by SubCUTAneous route nightly. Patient taking differently: 30 Units by SubCUTAneous route nightly. 14  Yes Arash Major MD   insulin lispro (HUMALOG) 100 unit/mL injection 7 Units by SubCUTAneous route Before breakfast, lunch, and dinner. 14  Yes Arash Major MD       Allergies   Allergen Reactions    Pcn [Penicillins] Unknown (comments)     Family allergy       Review of Systems  Gen: No fever, chills, malaise, weight loss/gain. Heent: No  rhinorrhea, epistaxis, ear pain, hearing loss, sinus pain, neck pain/stiffness, sore throat. Heart: No chest pain, palpitations, CLARK, pnd, or orthopnea. Resp: No cough, hemoptysis, wheezing and shortness of breath. GI: No  diarrhea, constipation, melena or hematochezia. : No urinary obstruction, dysuria or hematuria. Derm: No rash, new skin lesion or pruritis. Musc/skeletal: no bone or joint complains. Vasc: No edema, cyanosis or claudication. Endo: No heat/cold intolerance, no polyuria,polydipsia or polyphagia. Neuro: No unilateral weakness, numbness, tingling. No seizures. Heme: No easy bruising or bleeding.           Physical Exam:     Physical Exam:  Visit Vitals    /71    Pulse (!) 104    Temp 97.8 °F (36.6 °C)    Resp 16    Ht 5' 11\" (1.803 m)    Wt 77.1 kg (170 lb)    SpO2 100%    BMI 23.71 kg/m2      O2 Device: Room air    Temp (24hrs), Av.4 °F (36.3 °C), Min:97 °F (36.1 °C), Max:97.8 °F (36.6 °C)    04/14 0701 - 04/14 1900  In: -   Out: 400 [Urine:400]        General:  Awake, cooperative, no distress. Head:  Normocephalic, without obvious abnormality, atraumatic. Eyes:  Conjunctivae/corneas clear, sclera anicteric, PERRL, EOMs intact. Nose: Nares normal. No drainage or sinus tenderness. Throat: Lips, mucosa, and tongue normal.    Neck: Supple, symmetrical, trachea midline, no adenopathy. Lungs:   Clear to auscultation bilaterally. Heart:  Regular rate and rhythm, S1, S2 normal, no murmur, click, rub or gallop. Abdomen: Soft, non-tender. Bowel sounds normal. No masses,  No organomegaly. Extremities: Extremities normal, atraumatic, no cyanosis or edema. Capillary refill normal.   Pulses: 2+ and symmetric all extremities. Skin: Skin color pink/pale/mottled/flushed, turgor normal. No rashes or lesions   Neurologic: CNII-XII intact. No focal motor or sensory deficit.        Labs Reviewed:    CMP:   Lab Results   Component Value Date/Time     (L) 04/14/2017 08:22 AM    K 4.9 04/14/2017 08:22 AM    CL 94 (L) 04/14/2017 08:22 AM    CO2 11 (L) 04/14/2017 08:22 AM    AGAP 30 (H) 04/14/2017 08:22 AM     (HH) 04/14/2017 08:22 AM    BUN 13 04/14/2017 08:22 AM    CREA 1.66 (H) 04/14/2017 08:22 AM    GFRAA >60 04/14/2017 08:22 AM    GFRNA 50 (L) 04/14/2017 08:22 AM    CA 9.5 04/14/2017 08:22 AM    ALB 4.9 04/14/2017 08:22 AM    TP 10.1 (H) 04/14/2017 08:22 AM    GLOB 5.2 (H) 04/14/2017 08:22 AM    AGRAT 0.9 04/14/2017 08:22 AM    SGOT 13 (L) 04/14/2017 08:22 AM    ALT 29 04/14/2017 08:22 AM     CBC:   Lab Results   Component Value Date/Time    WBC 18.6 (H) 04/14/2017 08:22 AM    HGB 18.6 (H) 04/14/2017 08:22 AM    HCT 53.1 (H) 04/14/2017 08:22 AM     04/14/2017 08:22 AM         Procedures/imaging: see electronic medical records for all procedures/Xrays and details which were not copied into this note but were reviewed prior to creation of Plan        CC: Phys Other, MD

## 2017-04-14 NOTE — ED TRIAGE NOTES
Pt states that he woke up approx 3 am with nausea, vomiting;  States can't keep fluids down;   Has severe headache feels like an ice pick is stabbing his head;  Pt tried to take humalog but just came here instead

## 2017-04-14 NOTE — ED NOTES
Pt reports feeling much better at this time. Pt calm and cooperative, resting in stretcher on cell phone. MD to bedside to speak with pt. Pt denying pain at this time. Pt remains on cardiac monitor. Side rails raised x 2 and call light within reach.

## 2017-04-14 NOTE — DIABETES MGMT
NUTRITION ASSESSMENT / GLYCEMIC CONTROL PLAN OF CARE     Raman Kruse II           33 y.o.              Patient Active Problem List   Diagnosis Code    DKA, type 1 ([facility] E10.10    Dehydration E86.0    Hyponatremia E87.1    Abdominal pain R10.9    Nausea R11.0    ARF ([facility] failure) ([facility] N17.9    Vomiting R11.10        INTERVENTIONS/PLAN:     - consult received r/t DKA, DM1 & pt on Glucostabilizer insulin drip  - recommend continue drip at this time, pt DOES NOT MEET CRITERIA AT THIS TIME to transition to basal/bolus sub-q   - need two CO2 wnl x 2 lab draws (not met)   - need Anion gap <12 x 2 lab draws (not met)   - need drip rate multiplier to be 0.02 and steady or decreasing x 4 hours (n/a - will not be not met r/t pt eating)   - need BG to within target range x 4 hours  (not met)  - when pt transitions to sub-q insulin will need to make sure he has a mealtime dose as well, is TYPE 1 DM & will need coverage for all food intake  - DM education given (see note), pt also given refresher on CHO counting & [employer] brochure for affordable OTC resources  - encouraged OP DM Self Management Class (schedule given)      ASSESSMENT:   Nutritional Status: uncontrolled DM1, self-care & self-monitoring deficit, non-adherence to nutrition recommendations       Lab Results   Component Value Date/Time     (H) 04/14/2017 12:23 PM     (HH) 04/14/2017 08:22 AM    GLUCPOC 287 (H) 04/14/2017 02:58 PM    GLUCPOC 176 (H) 04/14/2017 02:01 PM    GLUCPOC 165 (H) 04/14/2017 01:02 PM    GLUCPOC 418 (HH) 04/14/2017 08:29 AM       Within target range (non-ICU: <140; ICU<180): [] Yes   [x]  No    Current Insulin regimen:   - Glucostabilizer    Home medication/insulin regimen: (*pt confirmed)  - Lantus 30 units qhs  - Humalog 5-6 units qac    HbA1c: equivalent  to ave BGlucose of 220 mg/dl for 2-3 months prior to admission    Lab Results   Component Value Date/Time    Hemoglobin A1c 9.3 04/14/2017 08:22 AM Adequate glycemic control PTA:  [] Yes  [x] No       SUBJECTIVE/OBJECTIVE:   Information obtained from: pt & chart; confirmed DM1, dx ~ 6 years ago, states was last in DKA in 2014, was able to confirm home insulin regimen (see above), states does not count CHO at home, \"good at estimating how much he needs based on his meal\", also reports ran out of glucometer supplies about 1 week ago, sees [facility] for primary care, they provide him with medicine & supplies, states he has an appt set up for next week, pt would like to eat, states no current nausea or abdominal pain      Medications: [x]                Reviewed        Labs:   Lab Results   Component Value Date/Time    Sodium 140 04/14/2017 12:23 PM    Potassium 4.3 04/14/2017 12:23 PM    Chloride 105 04/14/2017 12:23 PM    CO2 15 04/14/2017 12:23 PM    Anion gap 20 04/14/2017 12:23 PM    Glucose 193 04/14/2017 12:23 PM    BUN 10 04/14/2017 12:23 PM    Creatinine 1.28 04/14/2017 12:23 PM    Calcium 8.6 04/14/2017 12:23 PM    Magnesium 1.8 08/23/2014 11:50 AM    Phosphorus 2.4 01/14/2014 04:43 AM    Albumin 3.9 04/14/2017 12:23 PM       Anthropometrics: IBW : 78 kg (172 lb), % IBW (Calculated): 98.84 %, BMI (calculated): 23.7  Wt Readings from Last 1 Encounters:   04/14/17 77.1 kg (170 lb)    Ht Readings from Last 1 Encounters:   04/14/17 5' 11\" (1.803 m)       Estimated Nutrition Needs: 1925 Kcals/day (25 kcal/kg of actual wt), Protein (g): 92 g (1.2 g/kg) Fluid (ml): 1925 ml (1 ml/kcal)  Based on:   [x]          Actual BW    []          IBW   []            Adjusted BW        Diet:   Active Orders   Diet    DIET DIABETIC WITH OPTIONS Consistent Carb 1800kcal       Intake:   Patient Vitals for the past 100 hrs:   % Diet Eaten   04/14/17 1428 100 %         Nutrition Diagnoses:   1. Nutrition Diagnosis 1: altered nutrition-related lab values Related to: DM1  Nutrition Diagnosis 1 Evidenced By: pt admitted in DKA and A1C of 9.3%    2.  Nutrition Diagnosis 2: Limited adherence to nutrition-related recommendations Nutrition Diagnosis 2 Related to: DM1 Nutrition Diagnosis 2 Evidenced By: pt report of not CHo counting    3. Nutrition Diagnosis 3: Self-monitoring deficit Nutrition Diagnosis 3 Related to: Dm1 Nutrition Diagnosis 3 Evidenced By: pt not currently checking BG r/t \"ran out of supplies\"    Nutrition Interventions:   Intervention :Food/Nutrient Delivery: Yes  Meals/Snacks: General/healthful diet (Consistent CHO)  Intervention: Nutrition Education: Yes  Initial/Brief Nutrition Education: Purpose of nutrition education (CHO Counting review)  Intervention: Nutrition Counseling: Yes  Intervention: Coordination of Nutrition Care: Yes  Recommended Diet/Supplements: Continue current diet    Goal:   - BG will be within ICU target range of 140-180 mg/dl by 4/17/17  - pt will follow-up with OP PCM re: DM glucometer supplies by 5/1/17  - pt will demonstrate CHO counting during meal orders by 4/18/17      Nutrition Monitoring and Evaluation      [x]     Monitor po intake on meal rounds  [x]     Continue inpatient monitoring and intervention  []     Other:      Nutrition Risk:  []   High     []  Moderate    [x]  Minimal/Uncompromised    URIAH Rosenbaum, MPH, RD

## 2017-04-14 NOTE — PROGRESS NOTES
7016  Report received from RADHA Morales.    0205  Patient arrived on unit. Assumed patient care. 1230  Admission assessment complete. Patient A&O x4, VSS, NAD, denies pain at this time, O2 100% on RA. Insulin drip verified with DIANE Preciado upon arrival to unit. 1240  Glycemic control at bedside, patient provided with educational material.    1600  Reassessment complete per flowsheet, no changes noted at this time. 1721  Bedside and Verbal shift change report given to RUSSELL Vasquez (oncoming nurse) by Papito Devine. Tari Severe, RN (offgoing nurse).  Report included the following information SBAR, Kardex, Intake/Output, MAR, Recent Results and Cardiac Rhythm ST.

## 2017-04-15 LAB
ADMINISTERED INITIALS, ADMINIT: NORMAL
ANION GAP BLD CALC-SCNC: 11 MMOL/L (ref 3–18)
ANION GAP BLD CALC-SCNC: 12 MMOL/L (ref 3–18)
ANION GAP BLD CALC-SCNC: 13 MMOL/L (ref 3–18)
ANION GAP BLD CALC-SCNC: 9 MMOL/L (ref 3–18)
BUN SERPL-MCNC: 10 MG/DL (ref 7–18)
BUN SERPL-MCNC: 6 MG/DL (ref 7–18)
BUN SERPL-MCNC: 7 MG/DL (ref 7–18)
BUN SERPL-MCNC: 7 MG/DL (ref 7–18)
BUN/CREAT SERPL: 11 (ref 12–20)
BUN/CREAT SERPL: 5 (ref 12–20)
BUN/CREAT SERPL: 7 (ref 12–20)
BUN/CREAT SERPL: 8 (ref 12–20)
CALCIUM SERPL-MCNC: 8.2 MG/DL (ref 8.5–10.1)
CALCIUM SERPL-MCNC: 8.4 MG/DL (ref 8.5–10.1)
CALCIUM SERPL-MCNC: 8.6 MG/DL (ref 8.5–10.1)
CALCIUM SERPL-MCNC: 8.6 MG/DL (ref 8.5–10.1)
CHLORIDE SERPL-SCNC: 103 MMOL/L (ref 100–108)
CHLORIDE SERPL-SCNC: 104 MMOL/L (ref 100–108)
CHLORIDE SERPL-SCNC: 105 MMOL/L (ref 100–108)
CHLORIDE SERPL-SCNC: 105 MMOL/L (ref 100–108)
CO2 SERPL-SCNC: 19 MMOL/L (ref 21–32)
CO2 SERPL-SCNC: 20 MMOL/L (ref 21–32)
CO2 SERPL-SCNC: 22 MMOL/L (ref 21–32)
CO2 SERPL-SCNC: 25 MMOL/L (ref 21–32)
CREAT SERPL-MCNC: 0.86 MG/DL (ref 0.6–1.3)
CREAT SERPL-MCNC: 0.95 MG/DL (ref 0.6–1.3)
CREAT SERPL-MCNC: 0.99 MG/DL (ref 0.6–1.3)
CREAT SERPL-MCNC: 1.1 MG/DL (ref 0.6–1.3)
D50 ADMINISTERED, D50ADM: 0 ML
D50 ORDER, D50ORD: 0 ML
ERYTHROCYTE [DISTWIDTH] IN BLOOD BY AUTOMATED COUNT: 12.6 % (ref 11.6–14.5)
GLUCOSE BLD STRIP.AUTO-MCNC: 124 MG/DL (ref 70–110)
GLUCOSE BLD STRIP.AUTO-MCNC: 129 MG/DL (ref 70–110)
GLUCOSE BLD STRIP.AUTO-MCNC: 130 MG/DL (ref 70–110)
GLUCOSE BLD STRIP.AUTO-MCNC: 161 MG/DL (ref 70–110)
GLUCOSE BLD STRIP.AUTO-MCNC: 174 MG/DL (ref 70–110)
GLUCOSE BLD STRIP.AUTO-MCNC: 175 MG/DL (ref 70–110)
GLUCOSE BLD STRIP.AUTO-MCNC: 175 MG/DL (ref 70–110)
GLUCOSE BLD STRIP.AUTO-MCNC: 177 MG/DL (ref 70–110)
GLUCOSE BLD STRIP.AUTO-MCNC: 177 MG/DL (ref 70–110)
GLUCOSE BLD STRIP.AUTO-MCNC: 184 MG/DL (ref 70–110)
GLUCOSE BLD STRIP.AUTO-MCNC: 198 MG/DL (ref 70–110)
GLUCOSE BLD STRIP.AUTO-MCNC: 212 MG/DL (ref 70–110)
GLUCOSE BLD STRIP.AUTO-MCNC: 218 MG/DL (ref 70–110)
GLUCOSE BLD STRIP.AUTO-MCNC: 225 MG/DL (ref 70–110)
GLUCOSE BLD STRIP.AUTO-MCNC: 226 MG/DL (ref 70–110)
GLUCOSE BLD STRIP.AUTO-MCNC: 227 MG/DL (ref 70–110)
GLUCOSE BLD STRIP.AUTO-MCNC: 245 MG/DL (ref 70–110)
GLUCOSE BLD STRIP.AUTO-MCNC: 305 MG/DL (ref 70–110)
GLUCOSE BLD STRIP.AUTO-MCNC: 92 MG/DL (ref 70–110)
GLUCOSE SERPL-MCNC: 130 MG/DL (ref 74–99)
GLUCOSE SERPL-MCNC: 214 MG/DL (ref 74–99)
GLUCOSE SERPL-MCNC: 271 MG/DL (ref 74–99)
GLUCOSE SERPL-MCNC: 286 MG/DL (ref 74–99)
GLUCOSE, GLC: 124 MG/DL
GLUCOSE, GLC: 129 MG/DL
GLUCOSE, GLC: 130 MG/DL
GLUCOSE, GLC: 161 MG/DL
GLUCOSE, GLC: 174 MG/DL
GLUCOSE, GLC: 175 MG/DL
GLUCOSE, GLC: 175 MG/DL
GLUCOSE, GLC: 177 MG/DL
GLUCOSE, GLC: 177 MG/DL
GLUCOSE, GLC: 184 MG/DL
GLUCOSE, GLC: 198 MG/DL
GLUCOSE, GLC: 212 MG/DL
GLUCOSE, GLC: 218 MG/DL
GLUCOSE, GLC: 225 MG/DL
GLUCOSE, GLC: 226 MG/DL
GLUCOSE, GLC: 245 MG/DL
GLUCOSE, GLC: 305 MG/DL
GLUCOSE, GLC: 92 MG/DL
HCT VFR BLD AUTO: 40.4 % (ref 36–48)
HGB BLD-MCNC: 14.4 G/DL (ref 13–16)
HIGH TARGET, HITG: 180 MG/DL
INSULIN ADMINSTERED, INSADM: 1 UNITS/HOUR
INSULIN ADMINSTERED, INSADM: 1.6 UNITS/HOUR
INSULIN ADMINSTERED, INSADM: 1.7 UNITS/HOUR
INSULIN ADMINSTERED, INSADM: 1.8 UNITS/HOUR
INSULIN ADMINSTERED, INSADM: 1.8 UNITS/HOUR
INSULIN ADMINSTERED, INSADM: 1.9 UNITS/HOUR
INSULIN ADMINSTERED, INSADM: 10.8 UNITS/HOUR
INSULIN ADMINSTERED, INSADM: 11 UNITS/HOUR
INSULIN ADMINSTERED, INSADM: 11.4 UNITS/HOUR
INSULIN ADMINSTERED, INSADM: 2.1 UNITS/HOUR
INSULIN ADMINSTERED, INSADM: 2.5 UNITS/HOUR
INSULIN ADMINSTERED, INSADM: 4 UNITS/HOUR
INSULIN ADMINSTERED, INSADM: 5.6 UNITS/HOUR
INSULIN ADMINSTERED, INSADM: 6.5 UNITS/HOUR
INSULIN ADMINSTERED, INSADM: 7.6 UNITS/HOUR
INSULIN ADMINSTERED, INSADM: 9.1 UNITS/HOUR
INSULIN ORDER, INSORD: 1 UNITS/HOUR
INSULIN ORDER, INSORD: 1.6 UNITS/HOUR
INSULIN ORDER, INSORD: 1.7 UNITS/HOUR
INSULIN ORDER, INSORD: 1.8 UNITS/HOUR
INSULIN ORDER, INSORD: 1.8 UNITS/HOUR
INSULIN ORDER, INSORD: 1.9 UNITS/HOUR
INSULIN ORDER, INSORD: 10.8 UNITS/HOUR
INSULIN ORDER, INSORD: 11 UNITS/HOUR
INSULIN ORDER, INSORD: 11.4 UNITS/HOUR
INSULIN ORDER, INSORD: 2.1 UNITS/HOUR
INSULIN ORDER, INSORD: 2.5 UNITS/HOUR
INSULIN ORDER, INSORD: 4 UNITS/HOUR
INSULIN ORDER, INSORD: 5.6 UNITS/HOUR
INSULIN ORDER, INSORD: 6.5 UNITS/HOUR
INSULIN ORDER, INSORD: 7.6 UNITS/HOUR
INSULIN ORDER, INSORD: 9.1 UNITS/HOUR
LOW TARGET, LOT: 140 MG/DL
MCH RBC QN AUTO: 30.2 PG (ref 24–34)
MCHC RBC AUTO-ENTMCNC: 35.6 G/DL (ref 31–37)
MCV RBC AUTO: 84.7 FL (ref 74–97)
MINUTES UNTIL NEXT BG, NBG: 60 MIN
MULTIPLIER, MUL: 0.01
MULTIPLIER, MUL: 0.03
MULTIPLIER, MUL: 0.03
MULTIPLIER, MUL: 0.04
MULTIPLIER, MUL: 0.06
MULTIPLIER, MUL: 0.06
MULTIPLIER, MUL: 0.07
ORDER INITIALS, ORDINIT: NORMAL
PLATELET # BLD AUTO: 223 K/UL (ref 135–420)
PMV BLD AUTO: 9.9 FL (ref 9.2–11.8)
POTASSIUM SERPL-SCNC: 3 MMOL/L (ref 3.5–5.5)
POTASSIUM SERPL-SCNC: 3.3 MMOL/L (ref 3.5–5.5)
POTASSIUM SERPL-SCNC: 3.5 MMOL/L (ref 3.5–5.5)
POTASSIUM SERPL-SCNC: 3.9 MMOL/L (ref 3.5–5.5)
RBC # BLD AUTO: 4.77 M/UL (ref 4.7–5.5)
SODIUM SERPL-SCNC: 136 MMOL/L (ref 136–145)
SODIUM SERPL-SCNC: 136 MMOL/L (ref 136–145)
SODIUM SERPL-SCNC: 137 MMOL/L (ref 136–145)
SODIUM SERPL-SCNC: 139 MMOL/L (ref 136–145)
WBC # BLD AUTO: 12.1 K/UL (ref 4.6–13.2)

## 2017-04-15 PROCEDURE — 85027 COMPLETE CBC AUTOMATED: CPT | Performed by: HOSPITALIST

## 2017-04-15 PROCEDURE — 36415 COLL VENOUS BLD VENIPUNCTURE: CPT | Performed by: HOSPITALIST

## 2017-04-15 PROCEDURE — 80048 BASIC METABOLIC PNL TOTAL CA: CPT | Performed by: HOSPITALIST

## 2017-04-15 PROCEDURE — 65270000029 HC RM PRIVATE

## 2017-04-15 PROCEDURE — 74011000258 HC RX REV CODE- 258: Performed by: HOSPITALIST

## 2017-04-15 PROCEDURE — 74011250637 HC RX REV CODE- 250/637: Performed by: HOSPITALIST

## 2017-04-15 PROCEDURE — 74011636637 HC RX REV CODE- 636/637: Performed by: HOSPITALIST

## 2017-04-15 PROCEDURE — 74011250636 HC RX REV CODE- 250/636: Performed by: HOSPITALIST

## 2017-04-15 PROCEDURE — 82962 GLUCOSE BLOOD TEST: CPT

## 2017-04-15 RX ORDER — INSULIN LISPRO 100 [IU]/ML
INJECTION, SOLUTION INTRAVENOUS; SUBCUTANEOUS
Status: DISCONTINUED | OUTPATIENT
Start: 2017-04-15 | End: 2017-04-16 | Stop reason: HOSPADM

## 2017-04-15 RX ORDER — INSULIN LISPRO 100 [IU]/ML
7 INJECTION, SOLUTION INTRAVENOUS; SUBCUTANEOUS
Status: DISCONTINUED | OUTPATIENT
Start: 2017-04-15 | End: 2017-04-16

## 2017-04-15 RX ORDER — INSULIN GLARGINE 100 [IU]/ML
45 INJECTION, SOLUTION SUBCUTANEOUS DAILY
Status: DISCONTINUED | OUTPATIENT
Start: 2017-04-15 | End: 2017-04-16

## 2017-04-15 RX ORDER — POTASSIUM CHLORIDE 20 MEQ/1
40 TABLET, EXTENDED RELEASE ORAL
Status: COMPLETED | OUTPATIENT
Start: 2017-04-15 | End: 2017-04-15

## 2017-04-15 RX ADMIN — INSULIN LISPRO 4 UNITS: 100 INJECTION, SOLUTION INTRAVENOUS; SUBCUTANEOUS at 21:44

## 2017-04-15 RX ADMIN — INSULIN GLARGINE 45 UNITS: 100 INJECTION, SOLUTION SUBCUTANEOUS at 15:44

## 2017-04-15 RX ADMIN — POTASSIUM CHLORIDE 40 MEQ: 20 TABLET, EXTENDED RELEASE ORAL at 15:44

## 2017-04-15 RX ADMIN — SODIUM CHLORIDE 150 ML/HR: 900 INJECTION, SOLUTION INTRAVENOUS at 10:11

## 2017-04-15 RX ADMIN — DEXTROSE MONOHYDRATE AND SODIUM CHLORIDE 150 ML/HR: 5; .45 INJECTION, SOLUTION INTRAVENOUS at 01:01

## 2017-04-15 RX ADMIN — DEXTROSE MONOHYDRATE AND SODIUM CHLORIDE 150 ML/HR: 5; .45 INJECTION, SOLUTION INTRAVENOUS at 15:15

## 2017-04-15 NOTE — PROGRESS NOTES
1916 Assumed care of patient at this time. Patient resting in NAD. Alarms set and audible throughout the unit. Assessment completed and documented on the flow sheet. No express needs reported at this time. 2113 New PIV inserted for patient comfort. 8885 Ginger ale provided. 3600 Patient resting in NAD.     [id number] Patient had no acute events overnight. Currently resting in NAD.

## 2017-04-15 NOTE — PROGRESS NOTES
Hospitalist Progress Note    Patient: Ana Maria Cruz MRN: [id number]  CSN: [id number]    YOB: 1990  Age: 33 y.o. Sex: male    DOA: 4/14/2017 LOS:  LOS: 1 day                Assessment/Plan     Patient Active Problem List   Diagnosis Code    DKA, type 1 ([facility] E10.10    Dehydration E86.0    Hyponatremia E87.1    Abdominal pain R10.9    Nausea R11.0    ARF (acute renal failure) ([facility] N17.9    Vomiting R11.10            32 yo male with IDDM presents to DKA    DKA - resolved,  Discontinue insulin drip. Start on lantus, pre meal and SSI. Transfer to medical.  Replete potassium  DVT prophylaxis    Review of systems  General: No fevers or chills. Cardiovascular: No chest pain or pressure. No palpitations. Pulmonary: No shortness of breath. Gastrointestinal: No nausea, vomiting. Physical Exam:  General: Awake, cooperative, no acute distress    HEENT: NC, Atraumatic. PERRLA, anicteric sclerae. Lungs: CTA Bilaterally. No Wheezing/Rhonchi/Rales. Heart:  Regular  rhythm,  No murmur, No Rubs, No Gallops  Abdomen: Soft, Non distended, Non tender.  +Bowel sounds,   Extremities: No c/c/e  Psych:   Not anxious or agitated. Neurologic:  No acute neurological deficit. Vital signs/Intake and Output:  Visit Vitals    /63    Pulse 98    Temp 98.3 °F (36.8 °C)    Resp 16    Ht 5' 11\" (1.803 m)    Wt 79.5 kg (175 lb 4.3 oz)    SpO2 98%    BMI 24.44 kg/m2     Current Shift:  04/15 0701 - 04/15 1900  In: 1200 [P.O.:1200]  Out: 2000 [Urine:2000]  Last three shifts:  04/13 1901 - 04/15 0700  In: 1520.7 [P.O.:240;  I.V.:1280.7]  Out: 1600 [Urine:1600]            Labs: Results:       Chemistry Recent Labs      04/15/17   1338  04/15/17   0930  04/15/17   0520   04/14/17   1223  04/14/17   0822   GLU  286*  271*  214*   < >  193*  433*   NA  139  136  136   < >  140  135*   K  3.3*  3.5  3.0*   < >  4.3  4.9   CL  105  103  104   < >  105  94*   CO2  25  22  20*   < >  15* 11*   BUN  6*  7  7   < >  10  13   CREA  1.10  0.99  0.86   < >  1.28  1.66*   CA  8.2*  8.4*  8.6   < >  8.6  9.5   AGAP  9  11  12   < >  20*  30*   BUCR  5*  7*  8*   < >  8*  8*   AP   --    --    --    --   104  142*   TP   --    --    --    --   8.0  10.1*   ALB   --    --    --    --   3.9  4.9   GLOB   --    --    --    --   4.1*  5.2*   AGRAT   --    --    --    --   1.0  0.9    < > = values in this interval not displayed. CBC w/Diff Recent Labs      04/15/17   0520  04/14/17   0822   WBC  12.1  18.6*   RBC  4.77  6.00*   HGB  14.4  18.6*   HCT  40.4  53.1*   PLT  223  317   GRANS   --   86*   LYMPH   --   10*   EOS   --   0      Cardiac Enzymes Recent Labs      04/14/17   0822   CPK  93   CKND1  5.2*      Coagulation No results for input(s): PTP, INR, APTT in the last 72 hours. No lab exists for component: INREXT    Lipid Panel No results found for: CHOL, CHOLPOCT, CHOLX, CHLST, CHOLV, [id number], HDL, LDL, NLDLCT, DLDL, LDLC, DLDLP, [id number], VLDLC, VLDL, TGL, TGLX, TRIGL, [id number], TRIGP, TGLPOCT, [id number], CHHD, CHHDX   BNP No results for input(s): BNPP in the last 72 hours.    Liver Enzymes Recent Labs      04/14/17   1223   TP  8.0   ALB  3.9   AP  104   SGOT  9*      Thyroid Studies No results found for: T4, T3U, TSH, TSHEXT     Procedures/imaging: see electronic medical records for all procedures/Xrays and details which were not copied into this note but were reviewed prior to creation of Plan

## 2017-04-15 NOTE — PROGRESS NOTES
TRANSFER - OUT REPORT:    Verbal report given to Rosendo Mari on Chelle Lute II  being transferred to  (unit) for routine progression of care       Report consisted of patients Situation, Background, Assessment and   Recommendations(SBAR). Information from the following report(s) SBAR, Kardex, ED Summary, Intake/Output, MAR, Accordion, Recent Results, Med Rec Status, Cardiac Rhythm NSR_ST and Alarm Parameters  was reviewed with the receiving nurse. Lines:   Peripheral IV 04/14/17 Right Antecubital (Active)   Site Assessment Clean, dry, & intact 4/15/2017  4:00 PM   Phlebitis Assessment 0 4/15/2017  4:00 PM   Infiltration Assessment 0 4/15/2017  4:00 PM   Dressing Status Clean, dry, & intact 4/15/2017  4:00 PM   Dressing Type Tape;Transparent 4/15/2017  4:00 PM   Hub Color/Line Status Pink;Capped 4/15/2017  4:00 PM   Action Taken Open ports on tubing capped 4/15/2017  4:00 PM   Alcohol Cap Used Yes 4/15/2017  4:00 PM       Peripheral IV 04/14/17 Left Forearm (Active)   Site Assessment Clean, dry, & intact 4/15/2017  4:00 PM   Phlebitis Assessment 0 4/15/2017  4:00 PM   Infiltration Assessment 0 4/15/2017  4:00 PM   Dressing Status Clean, dry, & intact 4/15/2017  4:00 PM   Dressing Type Tape;Transparent 4/15/2017  4:00 PM   Hub Color/Line Status Pink;Flushed 4/15/2017  4:00 PM   Action Taken Open ports on tubing capped 4/15/2017  4:00 PM   Alcohol Cap Used Yes 4/15/2017  4:00 PM        Opportunity for questions and clarification was provided.       Patient transported with:   Registered Nurse  Tech     Cell phone and personal [email or web address]

## 2017-04-15 NOTE — ROUTINE PROCESS
Bedside and Verbal shift change report given to LINDSAY Prado RN (oncoming nurse) by Ryan Sanchez RN (offgoing nurse). Report included the following information SBAR, Kardex, ED Summary, Procedure Summary, Intake/Output, MAR, Accordion and Recent Results.

## 2017-04-15 NOTE — ROUTINE PROCESS
TRANSFER - IN REPORT:    Verbal report received from DAVID Moody RN(name) on Syeda Matthias II  being received from ICU(unit) for routine progression of care      Report consisted of patients Situation, Background, Assessment and   Recommendations(SBAR). Information from the following report(s) SBAR, Kardex, ED Summary, Procedure Summary, Intake/Output, MAR, Accordion, Recent Results and Med Rec Status was reviewed with the receiving nurse. Opportunity for questions and clarification was provided. Assessment completed upon patients arrival to unit and care assumed.

## 2017-04-15 NOTE — ROUTINE PROCESS
Bedside and Verbal shift change report given to DAVID Moody RN (oncoming nurse) by Jaqui Baer RN (offgoing nurse). Report included the following information SBAR, Kardex and MAR.

## 2017-04-16 VITALS
SYSTOLIC BLOOD PRESSURE: 125 MMHG | TEMPERATURE: 98.3 F | HEIGHT: 71 IN | DIASTOLIC BLOOD PRESSURE: 73 MMHG | HEART RATE: 97 BPM | RESPIRATION RATE: 18 BRPM | OXYGEN SATURATION: 96 % | WEIGHT: 175.8 LBS | BODY MASS INDEX: 24.61 KG/M2

## 2017-04-16 LAB
ANION GAP BLD CALC-SCNC: 8 MMOL/L (ref 3–18)
BUN SERPL-MCNC: 7 MG/DL (ref 7–18)
BUN/CREAT SERPL: 10 (ref 12–20)
CALCIUM SERPL-MCNC: 8.5 MG/DL (ref 8.5–10.1)
CHLORIDE SERPL-SCNC: 105 MMOL/L (ref 100–108)
CO2 SERPL-SCNC: 26 MMOL/L (ref 21–32)
CREAT SERPL-MCNC: 0.67 MG/DL (ref 0.6–1.3)
ERYTHROCYTE [DISTWIDTH] IN BLOOD BY AUTOMATED COUNT: 12.7 % (ref 11.6–14.5)
GLUCOSE BLD STRIP.AUTO-MCNC: 186 MG/DL (ref 70–110)
GLUCOSE BLD STRIP.AUTO-MCNC: 231 MG/DL (ref 70–110)
GLUCOSE SERPL-MCNC: 153 MG/DL (ref 74–99)
HCT VFR BLD AUTO: 39 % (ref 36–48)
HGB BLD-MCNC: 14 G/DL (ref 13–16)
MCH RBC QN AUTO: 30.5 PG (ref 24–34)
MCHC RBC AUTO-ENTMCNC: 35.9 G/DL (ref 31–37)
MCV RBC AUTO: 85 FL (ref 74–97)
PLATELET # BLD AUTO: 198 K/UL (ref 135–420)
PMV BLD AUTO: 9.5 FL (ref 9.2–11.8)
POTASSIUM SERPL-SCNC: 3 MMOL/L (ref 3.5–5.5)
RBC # BLD AUTO: 4.59 M/UL (ref 4.7–5.5)
SODIUM SERPL-SCNC: 139 MMOL/L (ref 136–145)
WBC # BLD AUTO: 8.6 K/UL (ref 4.6–13.2)

## 2017-04-16 PROCEDURE — 36415 COLL VENOUS BLD VENIPUNCTURE: CPT | Performed by: HOSPITALIST

## 2017-04-16 PROCEDURE — 80048 BASIC METABOLIC PNL TOTAL CA: CPT | Performed by: HOSPITALIST

## 2017-04-16 PROCEDURE — 74011250637 HC RX REV CODE- 250/637: Performed by: HOSPITALIST

## 2017-04-16 PROCEDURE — 85027 COMPLETE CBC AUTOMATED: CPT | Performed by: HOSPITALIST

## 2017-04-16 PROCEDURE — 74011636637 HC RX REV CODE- 636/637: Performed by: HOSPITALIST

## 2017-04-16 PROCEDURE — 82962 GLUCOSE BLOOD TEST: CPT

## 2017-04-16 RX ORDER — INSULIN GLARGINE 100 [IU]/ML
50 INJECTION, SOLUTION SUBCUTANEOUS DAILY
Status: DISCONTINUED | OUTPATIENT
Start: 2017-04-16 | End: 2017-04-16 | Stop reason: HOSPADM

## 2017-04-16 RX ORDER — POTASSIUM CHLORIDE 20 MEQ/1
40 TABLET, EXTENDED RELEASE ORAL 2 TIMES DAILY
Status: DISCONTINUED | OUTPATIENT
Start: 2017-04-16 | End: 2017-04-16 | Stop reason: HOSPADM

## 2017-04-16 RX ORDER — INSULIN LISPRO 100 [IU]/ML
10 INJECTION, SOLUTION INTRAVENOUS; SUBCUTANEOUS
Qty: 1 VIAL | Refills: 5 | Status: SHIPPED | OUTPATIENT
Start: 2017-04-16

## 2017-04-16 RX ORDER — INSULIN LISPRO 100 [IU]/ML
10 INJECTION, SOLUTION INTRAVENOUS; SUBCUTANEOUS
Status: DISCONTINUED | OUTPATIENT
Start: 2017-04-16 | End: 2017-04-16 | Stop reason: HOSPADM

## 2017-04-16 RX ORDER — INSULIN GLARGINE 100 [IU]/ML
50 INJECTION, SOLUTION SUBCUTANEOUS
Qty: 1 VIAL | Refills: 5 | Status: SHIPPED | OUTPATIENT
Start: 2017-04-16

## 2017-04-16 RX ADMIN — INSULIN LISPRO 4 UNITS: 100 INJECTION, SOLUTION INTRAVENOUS; SUBCUTANEOUS at 07:52

## 2017-04-16 RX ADMIN — INSULIN LISPRO 7 UNITS: 100 INJECTION, SOLUTION INTRAVENOUS; SUBCUTANEOUS at 07:52

## 2017-04-16 RX ADMIN — INSULIN LISPRO 2 UNITS: 100 INJECTION, SOLUTION INTRAVENOUS; SUBCUTANEOUS at 11:58

## 2017-04-16 RX ADMIN — INSULIN LISPRO 10 UNITS: 100 INJECTION, SOLUTION INTRAVENOUS; SUBCUTANEOUS at 11:58

## 2017-04-16 RX ADMIN — POTASSIUM CHLORIDE 40 MEQ: 20 TABLET, EXTENDED RELEASE ORAL at 10:41

## 2017-04-16 RX ADMIN — INSULIN GLARGINE 50 UNITS: 100 INJECTION, SOLUTION SUBCUTANEOUS at 10:41

## 2017-04-16 NOTE — ROUTINE PROCESS
Dual AVS reviewed with Noble Santoyo RN. All medications reviewed individually with patient. Opportunities for questions and concerns provided. Patient discharged via (mode of transport ie. Car, ambulance or air transport) car  Patient's arm band appropriately discarded.

## 2017-04-16 NOTE — DISCHARGE SUMMARY
Discharge Summary    Patient: Jak Lima MRN: [id number]  CSN: [id number]    YOB: 1990  Age: 33 y.o. Sex: male    DOA: 4/14/2017 LOS:  LOS: 2 days   Discharge Date:      Primary Care Provider:  Murray Schmidt MD    Admission Diagnoses: DKA, type 1 [facility])    Discharge Diagnoses:    Problem List as of 4/16/2017  Date Reviewed: 4/15/2014          Codes Class Noted - Resolved    Vomiting ICD-10-CM: R11.10  ICD-9-CM: 787.03  4/14/2017 - Present        [facility]F [facility]acute [facility]) ([facility] ICD-10-CM: N17.9  ICD-9-CM: 584.9  1/13/2014 - Present        * (Principal)DKA, type 1 ([facility] ICD-10-CM: E10.10  ICD-9-CM: 250.13  2/5/2013 - Present        Dehydration ICD-10-CM: E86.0  ICD-9-CM: 276.51  2/5/2013 - Present        Hyponatremia ICD-10-CM: E87.1  ICD-9-CM: 276.1  2/5/2013 - Present    Overview Signed 2/5/2013  3:50 PM by Jerie Gowers     pseudohyponatremia             Abdominal pain ICD-10-CM: R10.9  ICD-9-CM: 789.00  2/5/2013 - Present        Nausea ICD-10-CM: R11.0  ICD-9-CM: 787.02  2/5/2013 - Present              Discharge Medications:     Current Discharge Medication List      CONTINUE these medications which have CHANGED    Details   insulin glargine (LANTUS) 100 unit/mL injection 50 Units by SubCUTAneous route nightly. Qty: 1 Vial, Refills: 5      insulin lispro (HUMALOG) 100 unit/mL injection 10 Units by SubCUTAneous route Before breakfast, lunch, and dinner. Qty: 1 Vial, Refills: 5             Discharge Condition: Good          PHYSICAL EXAM   Visit Vitals    /73 (BP 1 Location: Right arm, BP Patient Position: At rest)    Pulse 72    Temp 98 °F (36.7 °C)    Resp 18    Ht 5' 11\" (1.803 m)    Wt 79.7 kg (175 lb 12.8 oz)    SpO2 94%    BMI 24.52 kg/m2     General: Awake, cooperative, no acute distress    HEENT: NC, Atraumatic. PERRLA, EOMI. Anicteric sclerae. Lungs:  CTA Bilaterally. No Wheezing/Rhonchi/Rales.   Heart:  Regular  rhythm,  No murmur, No Rubs, No Gallops  Abdomen: Soft, Non distended, Non tender. +Bowel sounds,   Extremities: No c/c/e  Psych:   Not anxious or agitated. Neurologic:  No acute neurological deficits. Admission HPI : Yanet Alford is a 33 y.o. male who has past history of IDDM presents to ER with complains of N/V that started early this morning. Patient reports that he has been working for the last 6 days and he may have missed lantus one day. He reports he is compliant with his medications. Associated symptoms include abdominal pain and headache. He reports that symptoms are similar whenever he is in DKA. He denies any fever/chills, SOB, diarrhea. In ER he is noted to have glucose of 433, anion gap of 30, CO2 of 11, creatine of 1.66, he was started on glucose stabilizer and IVF. He is being admitted for further management. Hospital Course :   Patient initially admitted to ICU and started on insulin drip, once his blood sugars improved and he met the parameters, he was started on his home basal and pre meal insulin, his blood sugars are controlled. We have increased his lantus and pre meal insulin. He is advised to follow carb count. He will follow up with his PCP.     Activity: Activity as tolerated    Diet: Diabetic Diet    Follow-up: PCP    Disposition: home    Minutes spent on discharge: 45       Labs: Results:       Chemistry Recent Labs      04/16/17   0202  04/15/17   1338  04/15/17   0930   04/14/17   1223  04/14/17   0822   GLU  153*  286*  271*   < >  193*  433*   NA  139  139  136   < >  140  135*   K  3.0*  3.3*  3.5   < >  4.3  4.9   CL  105  105  103   < >  105  94*   CO2  26  25  22   < >  15*  11*   BUN  7  6*  7   < >  10  13   CREA  0.67  1.10  0.99   < >  1.28  1.66*   CA  8.5  8.2*  8.4*   < >  8.6  9.5   AGAP  8  9  11   < >  20*  30*   BUCR  10*  5*  7*   < >  8*  8*   AP   --    --    --    --   104  142*   TP   --    --    --    --   8.0  10.1*   ALB   --    --    --    -- 3.9  4.9   GLOB   --    --    --    --   4.1*  5.2*   AGRAT   --    --    --    --   1.0  0.9    < > = values in this interval not displayed. CBC w/Diff Recent Labs      04/16/17   0202  04/15/17   0520  04/14/17   0822   WBC  8.6  12.1  18.6*   RBC  4.59*  4.77  6.00*   HGB  14.0  14.4  18.6*   HCT  39.0  40.4  53.1*   PLT  198  223  317   GRANS   --    --   86*   LYMPH   --    --   10*   EOS   --    --   0      Cardiac Enzymes Recent Labs      04/14/17   0822   CPK  93   CKND1  5.2*      Coagulation No results for input(s): PTP, INR, APTT in the last 72 hours. No lab exists for component: INREXT    Lipid Panel No results found for: CHOL, CHOLPOCT, CHOLX, CHLST, CHOLV, [id number], HDL, LDL, NLDLCT, DLDL, LDLC, DLDLP, [id number], VLDLC, VLDL, TGL, TGLX, TRIGL, [id number], TRIGP, TGLPOCT, [id number], CHHD, CHHDX   BNP No results for input(s): BNPP in the last 72 hours. Liver Enzymes Recent Labs      04/14/17   1223   TP  8.0   ALB  3.9   AP  104   SGOT  9*      Thyroid Studies No results found for: T4, T3U, TSH, TSHEXT         Significant Diagnostic Studies: No results found. No results found for this or any previous visit.         CC: Murray Schmidt MD

## 2017-04-16 NOTE — DISCHARGE INSTRUCTIONS
Lab Results   Component Value Date/Time    Hemoglobin A1c 9.3 04/14/2017 08:22 AM     This lab test reflects that your blood sugar averaged 220 mg/dl  over the past 3 months. It is important to follow up with your provider on a routine basis to continue to evaluate your blood sugar and discuss any necessary changes in treatment. Patient armband removed and shredded    DISCHARGE SUMMARY from Nurse    The following personal items are in your possession at time of discharge:       Visual Aid: None           Clothing: Shirt, Pants, Undergarments, Slippers  Other Valuables: Cell Phone, Other (comment) (Cell phone )             PATIENT INSTRUCTIONS:    After general anesthesia or intravenous sedation, for 24 hours or while taking prescription Narcotics:  · Limit your activities  · Do not drive and operate hazardous machinery  · Do not make important personal or business decisions  · Do  not drink alcoholic beverages  · If you have not urinated within 8 hours after discharge, please contact your surgeon on call. Report the following to your surgeon:  · Excessive pain, swelling, redness or odor of or around the surgical area  · Temperature over 100.5  · Nausea and vomiting lasting longer than 4 hours or if unable to take medications  · Any signs of decreased circulation or nerve impairment to extremity: change in color, persistent  numbness, tingling, coldness or increase pain  · Any questions        What to do at Home:  Recommended activity: Activity as tolerated. If you experience any of the following symptoms signs and symptoms of hyper or hypoglycemia: increased thirst, increased heart rate, dizziness, please follow up with MD/ED. *  Please give a list of your current medications to your Primary Care Provider. *  Please update this list whenever your medications are discontinued, doses are      changed, or new medications (including over-the-counter products) are added.     *  Please carry medication information at all times in case of emergency situations. These are general instructions for a healthy lifestyle:    No smoking/ No tobacco products/ Avoid exposure to second hand smoke    Surgeon General's Warning:  Quitting smoking now greatly reduces serious risk to your health. Obesity, smoking, and sedentary lifestyle greatly increases your risk for illness    A healthy diet, regular physical exercise & weight monitoring are important for maintaining a healthy lifestyle    You may be retaining fluid if you have a history of heart failure or if you experience any of the following symptoms:  Weight gain of 3 pounds or more overnight or 5 pounds in a week, increased swelling in our hands or feet or shortness of breath while lying flat in bed. Please call your doctor as soon as you notice any of these symptoms; do not wait until your next office visit. Recognize signs and symptoms of STROKE:    F-face looks uneven    A-arms unable to move or move unevenly    S-speech slurred or non-existent    T-time-call 911 as soon as signs and symptoms begin-DO NOT go       Back to bed or wait to see if you get better-TIME IS BRAIN. Warning Signs of HEART ATTACK     Call 911 if you have these symptoms:   Chest discomfort. Most heart attacks involve discomfort in the center of the chest that lasts more than a few minutes, or that goes away and comes back. It can feel like uncomfortable pressure, squeezing, fullness, or pain.  Discomfort in other areas of the upper body. Symptoms can include pain or discomfort in one or both arms, the back, neck, jaw, or stomach.  Shortness of breath with or without chest discomfort.  Other signs may include breaking out in a cold sweat, nausea, or lightheadedness. Don't wait more than five minutes to call 911 - MINUTES MATTER! Fast action can save your life. Calling 911 is almost always the fastest way to get lifesaving treatment.  Emergency Medical Services staff can begin treatment when they arrive -- up to an hour sooner than if someone gets to the hospital by car. The discharge information has been reviewed with the patient. The patient verbalized understanding. Discharge medications reviewed with the patient and appropriate educational materials and side effects teaching were provided.

## 2017-04-16 NOTE — PROGRESS NOTES
Shift summary: Pt A&Ox4, up ad lorie. Pt denies pain and denies nausea. Given insulin per [facility] before bed. Appears to be resting comfortably.     Patient Vitals for the past 12 hrs:   Temp Pulse Resp BP SpO2   04/16/17 0353 97.8 °F (36.6 °C) 67 18 126/73 98 %   04/15/17 2312 97.9 °F (36.6 °C) 71 18 131/81 98 %   04/15/17 1933 97.7 °F (36.5 °C) 76 18 128/71 96 %

## 2017-04-16 NOTE — PROGRESS NOTES
Pt Natasha Miladys, 32 yo male. Pt in bed resting during bedside report. Pt BS elevated, administered insulin with breakfast tray present. Pt instructed to eat within 15 mins to prevent hypoglycemia. Pt verbalized understanding. Pt informed he will be discharged today. Reviewed discharge instructions with pt.

## 2017-04-16 NOTE — ROUTINE PROCESS
Bedside and Verbal shift change report given to LINDSAY Orlando (oncoming nurse) by A. Elmarie Osgood, RN (offgoing nurse). Report included the following information SBAR, Kardex, Intake/Output and MAR.

## 2017-04-18 LAB — B-OH-BUTYR SERPL-MCNC: 121 MG/DL
